# Patient Record
Sex: FEMALE | Race: WHITE | NOT HISPANIC OR LATINO
[De-identification: names, ages, dates, MRNs, and addresses within clinical notes are randomized per-mention and may not be internally consistent; named-entity substitution may affect disease eponyms.]

---

## 2017-05-26 ENCOUNTER — APPOINTMENT (OUTPATIENT)
Dept: FAMILY MEDICINE | Facility: HOSPITAL | Age: 57
End: 2017-05-26

## 2017-05-26 ENCOUNTER — OUTPATIENT (OUTPATIENT)
Dept: OUTPATIENT SERVICES | Facility: HOSPITAL | Age: 57
LOS: 1 days | End: 2017-05-26
Payer: MEDICAID

## 2017-05-26 VITALS
WEIGHT: 145 LBS | BODY MASS INDEX: 26.52 KG/M2 | SYSTOLIC BLOOD PRESSURE: 136 MMHG | RESPIRATION RATE: 14 BRPM | DIASTOLIC BLOOD PRESSURE: 93 MMHG | HEART RATE: 102 BPM | OXYGEN SATURATION: 97 % | TEMPERATURE: 97.3 F

## 2017-05-27 PROCEDURE — G0463: CPT

## 2017-05-27 PROCEDURE — 85025 COMPLETE CBC W/AUTO DIFF WBC: CPT

## 2017-05-27 PROCEDURE — 80053 COMPREHEN METABOLIC PANEL: CPT

## 2017-05-27 PROCEDURE — 36415 COLL VENOUS BLD VENIPUNCTURE: CPT

## 2017-05-27 PROCEDURE — 83036 HEMOGLOBIN GLYCOSYLATED A1C: CPT

## 2017-05-27 PROCEDURE — 80061 LIPID PANEL: CPT

## 2017-06-09 ENCOUNTER — OUTPATIENT (OUTPATIENT)
Dept: OUTPATIENT SERVICES | Facility: HOSPITAL | Age: 57
LOS: 1 days | End: 2017-06-09
Payer: MEDICAID

## 2017-06-09 ENCOUNTER — APPOINTMENT (OUTPATIENT)
Dept: FAMILY MEDICINE | Facility: HOSPITAL | Age: 57
End: 2017-06-09

## 2017-06-09 VITALS
HEIGHT: 62 IN | BODY MASS INDEX: 27.05 KG/M2 | SYSTOLIC BLOOD PRESSURE: 132 MMHG | RESPIRATION RATE: 14 BRPM | OXYGEN SATURATION: 95 % | HEART RATE: 65 BPM | TEMPERATURE: 98.2 F | DIASTOLIC BLOOD PRESSURE: 77 MMHG | WEIGHT: 147 LBS

## 2017-06-09 DIAGNOSIS — Z87.898 PERSONAL HISTORY OF OTHER SPECIFIED CONDITIONS: ICD-10-CM

## 2017-06-09 PROCEDURE — G0463: CPT

## 2017-06-09 RX ORDER — NAPROXEN 500 MG/1
500 TABLET ORAL
Qty: 20 | Refills: 0 | Status: COMPLETED | COMMUNITY
Start: 2017-06-09 | End: 2017-06-19

## 2018-02-23 ENCOUNTER — LABORATORY RESULT (OUTPATIENT)
Age: 58
End: 2018-02-23

## 2018-02-23 ENCOUNTER — OUTPATIENT (OUTPATIENT)
Dept: OUTPATIENT SERVICES | Facility: HOSPITAL | Age: 58
LOS: 1 days | End: 2018-02-23
Payer: MEDICAID

## 2018-02-23 ENCOUNTER — APPOINTMENT (OUTPATIENT)
Dept: INTERNAL MEDICINE | Facility: CLINIC | Age: 58
End: 2018-02-23
Payer: MEDICAID

## 2018-02-23 VITALS
SYSTOLIC BLOOD PRESSURE: 123 MMHG | OXYGEN SATURATION: 98 % | DIASTOLIC BLOOD PRESSURE: 60 MMHG | HEART RATE: 66 BPM | HEIGHT: 62 IN | BODY MASS INDEX: 26.31 KG/M2 | WEIGHT: 143 LBS

## 2018-02-23 DIAGNOSIS — H53.8 OTHER VISUAL DISTURBANCES: ICD-10-CM

## 2018-02-23 DIAGNOSIS — J01.10 ACUTE FRONTAL SINUSITIS, UNSPECIFIED: ICD-10-CM

## 2018-02-23 DIAGNOSIS — R79.89 OTHER SPECIFIED ABNORMAL FINDINGS OF BLOOD CHEMISTRY: ICD-10-CM

## 2018-02-23 DIAGNOSIS — Z23 ENCOUNTER FOR IMMUNIZATION: ICD-10-CM

## 2018-02-23 DIAGNOSIS — Z00.00 ENCOUNTER FOR GENERAL ADULT MEDICAL EXAMINATION W/OUT ABNORMAL FINDINGS: ICD-10-CM

## 2018-02-23 DIAGNOSIS — Z88.9 ALLERGY STATUS TO UNSPECIFIED DRUGS, MEDICAMENTS AND BIOLOGICAL SUBSTANCES: ICD-10-CM

## 2018-02-23 DIAGNOSIS — R19.5 OTHER FECAL ABNORMALITIES: ICD-10-CM

## 2018-02-23 DIAGNOSIS — M19.90 UNSPECIFIED OSTEOARTHRITIS, UNSPECIFIED SITE: ICD-10-CM

## 2018-02-23 DIAGNOSIS — J01.00 ACUTE MAXILLARY SINUSITIS, UNSPECIFIED: ICD-10-CM

## 2018-02-23 DIAGNOSIS — Z87.898 PERSONAL HISTORY OF OTHER SPECIFIED CONDITIONS: ICD-10-CM

## 2018-02-23 DIAGNOSIS — Z86.19 PERSONAL HISTORY OF OTHER INFECTIOUS AND PARASITIC DISEASES: ICD-10-CM

## 2018-02-23 PROCEDURE — 85027 COMPLETE CBC AUTOMATED: CPT

## 2018-02-23 PROCEDURE — 86140 C-REACTIVE PROTEIN: CPT

## 2018-02-23 PROCEDURE — 84443 ASSAY THYROID STIM HORMONE: CPT

## 2018-02-23 PROCEDURE — 86431 RHEUMATOID FACTOR QUANT: CPT

## 2018-02-23 PROCEDURE — 99213 OFFICE O/P EST LOW 20 MIN: CPT | Mod: GE

## 2018-02-23 PROCEDURE — 86200 CCP ANTIBODY: CPT

## 2018-02-23 PROCEDURE — G0008: CPT

## 2018-02-23 PROCEDURE — 83036 HEMOGLOBIN GLYCOSYLATED A1C: CPT

## 2018-02-23 PROCEDURE — 82306 VITAMIN D 25 HYDROXY: CPT

## 2018-02-23 PROCEDURE — G0463: CPT

## 2018-02-23 PROCEDURE — 80053 COMPREHEN METABOLIC PANEL: CPT

## 2018-02-23 PROCEDURE — 90688 IIV4 VACCINE SPLT 0.5 ML IM: CPT

## 2018-02-23 PROCEDURE — 86803 HEPATITIS C AB TEST: CPT

## 2018-02-23 PROCEDURE — 80061 LIPID PANEL: CPT

## 2018-02-26 DIAGNOSIS — I10 ESSENTIAL (PRIMARY) HYPERTENSION: ICD-10-CM

## 2018-02-27 LAB
ALBUMIN SERPL ELPH-MCNC: 4.3 G/DL — SIGNIFICANT CHANGE UP (ref 3.3–5)
ALP SERPL-CCNC: 93 U/L — SIGNIFICANT CHANGE UP (ref 40–120)
ALT FLD-CCNC: 21 U/L — SIGNIFICANT CHANGE UP (ref 10–45)
ANION GAP SERPL CALC-SCNC: 15 MMOL/L — SIGNIFICANT CHANGE UP (ref 5–17)
AST SERPL-CCNC: 16 U/L — SIGNIFICANT CHANGE UP (ref 10–40)
BILIRUB SERPL-MCNC: 0.2 MG/DL — SIGNIFICANT CHANGE UP (ref 0.2–1.2)
BUN SERPL-MCNC: 16 MG/DL — SIGNIFICANT CHANGE UP (ref 7–23)
CALCIUM SERPL-MCNC: 9.4 MG/DL — SIGNIFICANT CHANGE UP (ref 8.4–10.5)
CHLORIDE SERPL-SCNC: 102 MMOL/L — SIGNIFICANT CHANGE UP (ref 96–108)
CHOLEST SERPL-MCNC: 202 MG/DL — HIGH (ref 10–199)
CO2 SERPL-SCNC: 23 MMOL/L — SIGNIFICANT CHANGE UP (ref 22–31)
CREAT SERPL-MCNC: 0.69 MG/DL — SIGNIFICANT CHANGE UP (ref 0.5–1.3)
CRP SERPL-MCNC: 0.2 MG/DL — SIGNIFICANT CHANGE UP (ref 0–0.4)
GLUCOSE SERPL-MCNC: 92 MG/DL — SIGNIFICANT CHANGE UP (ref 70–99)
HBA1C BLD-MCNC: 5.6 % — SIGNIFICANT CHANGE UP (ref 4–5.6)
HCT VFR BLD CALC: 44.9 % — SIGNIFICANT CHANGE UP (ref 34.5–45)
HCV AB S/CO SERPL IA: 0.1 S/CO — SIGNIFICANT CHANGE UP
HCV AB SERPL-IMP: SIGNIFICANT CHANGE UP
HDLC SERPL-MCNC: 49 MG/DL — SIGNIFICANT CHANGE UP (ref 40–125)
HGB BLD-MCNC: 13.7 G/DL — SIGNIFICANT CHANGE UP (ref 11.5–15.5)
LIPID PNL WITH DIRECT LDL SERPL: 118 MG/DL — SIGNIFICANT CHANGE UP
MCHC RBC-ENTMCNC: 30.5 GM/DL — LOW (ref 32–36)
MCHC RBC-ENTMCNC: 30.7 PG — SIGNIFICANT CHANGE UP (ref 27–34)
MCV RBC AUTO: 100.7 FL — HIGH (ref 80–100)
PLATELET # BLD AUTO: 319 K/UL — SIGNIFICANT CHANGE UP (ref 150–400)
POTASSIUM SERPL-MCNC: 4.1 MMOL/L — SIGNIFICANT CHANGE UP (ref 3.5–5.3)
POTASSIUM SERPL-SCNC: 4.1 MMOL/L — SIGNIFICANT CHANGE UP (ref 3.5–5.3)
PROT SERPL-MCNC: 7.7 G/DL — SIGNIFICANT CHANGE UP (ref 6–8.3)
RBC # BLD: 4.46 M/UL — SIGNIFICANT CHANGE UP (ref 3.8–5.2)
RBC # FLD: 13.3 % — SIGNIFICANT CHANGE UP (ref 10.3–14.5)
RHEUMATOID FACT SERPL-ACNC: 8 IU/ML — SIGNIFICANT CHANGE UP (ref 0–13)
SODIUM SERPL-SCNC: 140 MMOL/L — SIGNIFICANT CHANGE UP (ref 135–145)
TOTAL CHOLESTEROL/HDL RATIO MEASUREMENT: 4.1 RATIO — SIGNIFICANT CHANGE UP (ref 3.3–7.1)
TRIGL SERPL-MCNC: 176 MG/DL — HIGH (ref 10–149)
TSH SERPL-MCNC: 1.11 UIU/ML — SIGNIFICANT CHANGE UP (ref 0.27–4.2)
WBC # BLD: 6.69 K/UL — SIGNIFICANT CHANGE UP (ref 3.8–10.5)
WBC # FLD AUTO: 6.69 K/UL — SIGNIFICANT CHANGE UP (ref 3.8–10.5)

## 2018-02-28 LAB
24R-OH-CALCIDIOL SERPL-MCNC: 12.3 NG/ML — LOW (ref 30–80)
CCP IGG SERPL-ACNC: <8 UNITS — SIGNIFICANT CHANGE UP (ref 0–19)
RF+CCP IGG SER-IMP: NEGATIVE — SIGNIFICANT CHANGE UP

## 2018-03-05 ENCOUNTER — OUTPATIENT (OUTPATIENT)
Dept: OUTPATIENT SERVICES | Facility: HOSPITAL | Age: 58
LOS: 1 days | End: 2018-03-05

## 2018-03-05 ENCOUNTER — APPOINTMENT (OUTPATIENT)
Dept: OPHTHALMOLOGY | Facility: CLINIC | Age: 58
End: 2018-03-05

## 2018-03-15 ENCOUNTER — FORM ENCOUNTER (OUTPATIENT)
Age: 58
End: 2018-03-15

## 2018-03-16 ENCOUNTER — OUTPATIENT (OUTPATIENT)
Dept: OUTPATIENT SERVICES | Facility: HOSPITAL | Age: 58
LOS: 1 days | End: 2018-03-16
Payer: MEDICAID

## 2018-03-16 ENCOUNTER — APPOINTMENT (OUTPATIENT)
Dept: MAMMOGRAPHY | Facility: CLINIC | Age: 58
End: 2018-03-16
Payer: MEDICAID

## 2018-03-16 DIAGNOSIS — Z00.8 ENCOUNTER FOR OTHER GENERAL EXAMINATION: ICD-10-CM

## 2018-03-16 PROCEDURE — 77063 BREAST TOMOSYNTHESIS BI: CPT | Mod: 26

## 2018-03-16 PROCEDURE — 77067 SCR MAMMO BI INCL CAD: CPT | Mod: 26

## 2018-03-16 PROCEDURE — 77067 SCR MAMMO BI INCL CAD: CPT

## 2018-03-16 PROCEDURE — 77063 BREAST TOMOSYNTHESIS BI: CPT

## 2018-03-20 ENCOUNTER — APPOINTMENT (OUTPATIENT)
Dept: OBGYN | Facility: CLINIC | Age: 58
End: 2018-03-20

## 2018-03-22 DIAGNOSIS — H25.13 AGE-RELATED NUCLEAR CATARACT, BILATERAL: ICD-10-CM

## 2018-04-12 ENCOUNTER — OUTPATIENT (OUTPATIENT)
Dept: OUTPATIENT SERVICES | Facility: HOSPITAL | Age: 58
LOS: 1 days | End: 2018-04-12
Payer: MEDICAID

## 2018-04-12 ENCOUNTER — LABORATORY RESULT (OUTPATIENT)
Age: 58
End: 2018-04-12

## 2018-04-12 ENCOUNTER — APPOINTMENT (OUTPATIENT)
Dept: OBGYN | Facility: CLINIC | Age: 58
End: 2018-04-12
Payer: MEDICAID

## 2018-04-12 VITALS — SYSTOLIC BLOOD PRESSURE: 118 MMHG | WEIGHT: 147 LBS | BODY MASS INDEX: 26.89 KG/M2 | DIASTOLIC BLOOD PRESSURE: 80 MMHG

## 2018-04-12 DIAGNOSIS — N95.2 POSTMENOPAUSAL ATROPHIC VAGINITIS: ICD-10-CM

## 2018-04-12 DIAGNOSIS — N76.0 ACUTE VAGINITIS: ICD-10-CM

## 2018-04-12 PROCEDURE — 87624 HPV HI-RISK TYP POOLED RSLT: CPT

## 2018-04-12 PROCEDURE — G0463: CPT

## 2018-04-12 PROCEDURE — 99214 OFFICE O/P EST MOD 30 MIN: CPT | Mod: NC

## 2018-04-12 RX ORDER — ESTRADIOL 0.1 MG/G
0.1 CREAM VAGINAL
Qty: 1 | Refills: 5 | Status: ACTIVE | COMMUNITY
Start: 2018-04-12 | End: 1900-01-01

## 2018-04-13 LAB
C TRACH RRNA SPEC QL NAA+PROBE: SIGNIFICANT CHANGE UP
HPV HIGH+LOW RISK DNA PNL CVX: SIGNIFICANT CHANGE UP
N GONORRHOEA RRNA SPEC QL NAA+PROBE: SIGNIFICANT CHANGE UP
SPECIMEN SOURCE: SIGNIFICANT CHANGE UP

## 2018-04-16 LAB — CYTOLOGY SPEC DOC CYTO: SIGNIFICANT CHANGE UP

## 2018-04-17 DIAGNOSIS — N95.2 POSTMENOPAUSAL ATROPHIC VAGINITIS: ICD-10-CM

## 2018-05-15 ENCOUNTER — APPOINTMENT (OUTPATIENT)
Dept: GASTROENTEROLOGY | Facility: HOSPITAL | Age: 58
End: 2018-05-15

## 2018-07-02 ENCOUNTER — APPOINTMENT (OUTPATIENT)
Dept: RHEUMATOLOGY | Facility: CLINIC | Age: 58
End: 2018-07-02

## 2019-04-29 ENCOUNTER — APPOINTMENT (OUTPATIENT)
Dept: INTERNAL MEDICINE | Facility: CLINIC | Age: 59
End: 2019-04-29
Payer: MEDICAID

## 2019-04-29 ENCOUNTER — OUTPATIENT (OUTPATIENT)
Dept: OUTPATIENT SERVICES | Facility: HOSPITAL | Age: 59
LOS: 1 days | End: 2019-04-29
Payer: MEDICAID

## 2019-04-29 VITALS
DIASTOLIC BLOOD PRESSURE: 88 MMHG | WEIGHT: 143 LBS | HEIGHT: 62 IN | HEART RATE: 72 BPM | OXYGEN SATURATION: 97 % | SYSTOLIC BLOOD PRESSURE: 120 MMHG | BODY MASS INDEX: 26.31 KG/M2

## 2019-04-29 DIAGNOSIS — M19.90 UNSPECIFIED OSTEOARTHRITIS, UNSPECIFIED SITE: ICD-10-CM

## 2019-04-29 DIAGNOSIS — I10 ESSENTIAL (PRIMARY) HYPERTENSION: ICD-10-CM

## 2019-04-29 DIAGNOSIS — Z00.00 ENCOUNTER FOR GENERAL ADULT MEDICAL EXAMINATION W/OUT ABNORMAL FINDINGS: ICD-10-CM

## 2019-04-29 DIAGNOSIS — R19.5 OTHER FECAL ABNORMALITIES: ICD-10-CM

## 2019-04-29 PROCEDURE — G0463: CPT

## 2019-04-29 PROCEDURE — 99213 OFFICE O/P EST LOW 20 MIN: CPT | Mod: GE

## 2019-04-29 RX ORDER — MECLIZINE HYDROCHLORIDE 25 MG/1
25 TABLET ORAL 3 TIMES DAILY
Qty: 15 | Refills: 0 | Status: DISCONTINUED | COMMUNITY
Start: 2017-05-26 | End: 2019-04-29

## 2019-04-29 RX ORDER — FLUTICASONE PROPIONATE 50 UG/1
50 SPRAY, METERED NASAL DAILY
Qty: 1 | Refills: 0 | Status: DISCONTINUED | COMMUNITY
Start: 2017-05-26 | End: 2019-04-29

## 2019-04-29 RX ORDER — MULTIVITAMIN/IRON/FOLIC ACID 18MG-0.4MG
600-400 TABLET ORAL
Qty: 180 | Refills: 3 | Status: DISCONTINUED | COMMUNITY
Start: 2018-04-12 | End: 2019-04-29

## 2019-04-29 RX ORDER — CHOLECALCIFEROL (VITAMIN D3) 1250 MCG
1.25 MG CAPSULE ORAL
Qty: 5 | Refills: 0 | Status: DISCONTINUED | COMMUNITY
Start: 2018-02-28 | End: 2019-04-29

## 2019-05-03 ENCOUNTER — RX RENEWAL (OUTPATIENT)
Age: 59
End: 2019-05-03

## 2019-05-03 LAB
25(OH)D3 SERPL-MCNC: 19.9 NG/ML
ANION GAP SERPL CALC-SCNC: 11 MMOL/L
BASOPHILS # BLD AUTO: 0.05 K/UL
BASOPHILS NFR BLD AUTO: 0.8 %
BUN SERPL-MCNC: 18 MG/DL
CALCIUM SERPL-MCNC: 10 MG/DL
CHLORIDE SERPL-SCNC: 106 MMOL/L
CHOLEST SERPL-MCNC: 189 MG/DL
CHOLEST/HDLC SERPL: 3.8 RATIO
CO2 SERPL-SCNC: 25 MMOL/L
CREAT SERPL-MCNC: 0.65 MG/DL
EOSINOPHIL # BLD AUTO: 0.25 K/UL
EOSINOPHIL NFR BLD AUTO: 4.1 %
ESTIMATED AVERAGE GLUCOSE: 114 MG/DL
GLUCOSE SERPL-MCNC: 95 MG/DL
HBA1C MFR BLD HPLC: 5.6 %
HCT VFR BLD CALC: 44.5 %
HDLC SERPL-MCNC: 50 MG/DL
HGB BLD-MCNC: 14.7 G/DL
IMM GRANULOCYTES NFR BLD AUTO: 0.2 %
LDLC SERPL CALC-MCNC: 103 MG/DL
LYMPHOCYTES # BLD AUTO: 1.92 K/UL
LYMPHOCYTES NFR BLD AUTO: 31.1 %
MAN DIFF?: NORMAL
MCHC RBC-ENTMCNC: 30.5 PG
MCHC RBC-ENTMCNC: 33 GM/DL
MCV RBC AUTO: 92.3 FL
MONOCYTES # BLD AUTO: 0.44 K/UL
MONOCYTES NFR BLD AUTO: 7.1 %
NEUTROPHILS # BLD AUTO: 3.5 K/UL
NEUTROPHILS NFR BLD AUTO: 56.7 %
PLATELET # BLD AUTO: 309 K/UL
POTASSIUM SERPL-SCNC: 4.4 MMOL/L
RBC # BLD: 4.82 M/UL
RBC # FLD: 12.4 %
SODIUM SERPL-SCNC: 142 MMOL/L
TRIGL SERPL-MCNC: 178 MG/DL
WBC # FLD AUTO: 6.17 K/UL

## 2019-05-03 NOTE — PHYSICAL EXAM
[No Acute Distress] : no acute distress [Well Developed] : well developed [EOMI] : extraocular movements intact [No JVD] : no jugular venous distention [Normal Outer Ear/Nose] : the outer ears and nose were normal in appearance [No Lymphadenopathy] : no lymphadenopathy [Supple] : supple [No Respiratory Distress] : no respiratory distress  [Normal Rate] : normal rate  [No Accessory Muscle Use] : no accessory muscle use [Normal S1, S2] : normal S1 and S2 [Regular Rhythm] : with a regular rhythm [Soft] : abdomen soft [No Edema] : there was no peripheral edema [No Murmur] : no murmur heard [Non-distended] : non-distended [Non Tender] : non-tender [No CVA Tenderness] : no CVA  tenderness [No Rash] : no rash [No Joint Swelling] : no joint swelling [Alert and Oriented x3] : oriented to person, place, and time

## 2019-05-05 NOTE — HISTORY OF PRESENT ILLNESS
[de-identified] : \par 59 y/o F w/ no significant PMHx presents for CPE.\par \par Bloody stools - Pt reports intermittently having small bright red blood in her stools for last year. Pt notes blood is present in toilet bowl water and when she wipes, but not in stool. She denies any loss of appetite, change in caliber of stools, abdominal pain, or signiificant weight loss. Patient was given colonoscopy referral last year but never scheduled appt. Patient smokes 1 cigarette nightly\par \par

## 2019-05-05 NOTE — ASSESSMENT
[FreeTextEntry1] : \par 57 y/o F w/ no significant PMHx presents for CPE\par \par #Bloody stools\par Pt has been having intermittent small bright red blood in stools for past year. Likely hemorrhoids, but will r/o other pathology.\par - Given referral for colonoscopy, and urged patient to schedule appt. Will call and try to expedite appt time\par \par \par #HCM\par - Last mammogram: 3/2018  BIRADS 1, mammogram referral given\par - Last PAP smear: 4/2018 neg\par - Colonoscopy referral given\par - DEXA referral given as per pt request\par - Tdap UTD\par - A1C, Lipid panel, CBC, BMP, Vit D today\par \par RTC as needed\par d/w Dr. Pickering

## 2019-05-06 DIAGNOSIS — R19.5 OTHER FECAL ABNORMALITIES: ICD-10-CM

## 2019-05-06 DIAGNOSIS — M19.90 UNSPECIFIED OSTEOARTHRITIS, UNSPECIFIED SITE: ICD-10-CM

## 2019-05-14 ENCOUNTER — APPOINTMENT (OUTPATIENT)
Dept: RHEUMATOLOGY | Facility: CLINIC | Age: 59
End: 2019-05-14

## 2019-05-16 DIAGNOSIS — R79.89 OTHER SPECIFIED ABNORMAL FINDINGS OF BLOOD CHEMISTRY: ICD-10-CM

## 2019-05-16 RX ORDER — ADHESIVE TAPE 3"X 2.3 YD
50 MCG TAPE, NON-MEDICATED TOPICAL
Qty: 90 | Refills: 0 | Status: ACTIVE | COMMUNITY
Start: 2019-05-16 | End: 1900-01-01

## 2019-05-20 ENCOUNTER — FORM ENCOUNTER (OUTPATIENT)
Age: 59
End: 2019-05-20

## 2019-05-21 ENCOUNTER — OUTPATIENT (OUTPATIENT)
Dept: OUTPATIENT SERVICES | Facility: HOSPITAL | Age: 59
LOS: 1 days | End: 2019-05-21
Payer: MEDICAID

## 2019-05-21 ENCOUNTER — APPOINTMENT (OUTPATIENT)
Dept: MAMMOGRAPHY | Facility: CLINIC | Age: 59
End: 2019-05-21

## 2019-05-21 ENCOUNTER — APPOINTMENT (OUTPATIENT)
Dept: RADIOLOGY | Facility: CLINIC | Age: 59
End: 2019-05-21
Payer: MEDICAID

## 2019-05-21 DIAGNOSIS — Z00.00 ENCOUNTER FOR GENERAL ADULT MEDICAL EXAMINATION WITHOUT ABNORMAL FINDINGS: ICD-10-CM

## 2019-05-21 PROCEDURE — 77067 SCR MAMMO BI INCL CAD: CPT

## 2019-05-21 PROCEDURE — 77063 BREAST TOMOSYNTHESIS BI: CPT

## 2019-05-21 PROCEDURE — 77080 DXA BONE DENSITY AXIAL: CPT

## 2019-05-21 PROCEDURE — 77080 DXA BONE DENSITY AXIAL: CPT | Mod: 26

## 2019-05-21 PROCEDURE — 77067 SCR MAMMO BI INCL CAD: CPT | Mod: 26

## 2019-05-21 PROCEDURE — 77063 BREAST TOMOSYNTHESIS BI: CPT | Mod: 26

## 2019-06-03 ENCOUNTER — APPOINTMENT (OUTPATIENT)
Dept: GASTROENTEROLOGY | Facility: CLINIC | Age: 59
End: 2019-06-03

## 2020-03-01 ENCOUNTER — OUTPATIENT (OUTPATIENT)
Dept: OUTPATIENT SERVICES | Facility: HOSPITAL | Age: 60
LOS: 1 days | End: 2020-03-01
Payer: MEDICAID

## 2020-03-01 PROCEDURE — G9001: CPT

## 2020-03-10 ENCOUNTER — EMERGENCY (EMERGENCY)
Facility: HOSPITAL | Age: 60
LOS: 1 days | Discharge: HOME | End: 2020-03-10
Attending: EMERGENCY MEDICINE
Payer: MEDICAID

## 2020-03-10 VITALS
HEIGHT: 60 IN | TEMPERATURE: 98 F | HEART RATE: 95 BPM | WEIGHT: 143.08 LBS | DIASTOLIC BLOOD PRESSURE: 87 MMHG | RESPIRATION RATE: 18 BRPM | OXYGEN SATURATION: 96 % | SYSTOLIC BLOOD PRESSURE: 137 MMHG

## 2020-03-10 VITALS
SYSTOLIC BLOOD PRESSURE: 119 MMHG | RESPIRATION RATE: 18 BRPM | TEMPERATURE: 98 F | OXYGEN SATURATION: 97 % | HEART RATE: 69 BPM | DIASTOLIC BLOOD PRESSURE: 77 MMHG

## 2020-03-10 LAB
ALBUMIN SERPL ELPH-MCNC: 4.6 G/DL — SIGNIFICANT CHANGE UP (ref 3.3–5)
ALP SERPL-CCNC: 85 U/L — SIGNIFICANT CHANGE UP (ref 40–120)
ALT FLD-CCNC: 20 U/L — SIGNIFICANT CHANGE UP (ref 10–45)
ANION GAP SERPL CALC-SCNC: 14 MMOL/L — SIGNIFICANT CHANGE UP (ref 5–17)
APTT BLD: 30.5 SEC — SIGNIFICANT CHANGE UP (ref 27.5–36.3)
AST SERPL-CCNC: 14 U/L — SIGNIFICANT CHANGE UP (ref 10–40)
BILIRUB SERPL-MCNC: 0.3 MG/DL — SIGNIFICANT CHANGE UP (ref 0.2–1.2)
BUN SERPL-MCNC: 13 MG/DL — SIGNIFICANT CHANGE UP (ref 7–23)
CALCIUM SERPL-MCNC: 9.5 MG/DL — SIGNIFICANT CHANGE UP (ref 8.4–10.5)
CHLORIDE SERPL-SCNC: 105 MMOL/L — SIGNIFICANT CHANGE UP (ref 96–108)
CK MB BLD-MCNC: 1.6 % — SIGNIFICANT CHANGE UP (ref 0–3.5)
CK MB CFR SERPL CALC: 1.9 NG/ML — SIGNIFICANT CHANGE UP (ref 0–3.8)
CK SERPL-CCNC: 122 U/L — SIGNIFICANT CHANGE UP (ref 25–170)
CO2 SERPL-SCNC: 22 MMOL/L — SIGNIFICANT CHANGE UP (ref 22–31)
CREAT SERPL-MCNC: 0.6 MG/DL — SIGNIFICANT CHANGE UP (ref 0.5–1.3)
GLUCOSE SERPL-MCNC: 105 MG/DL — HIGH (ref 70–99)
HCT VFR BLD CALC: 40.2 % — SIGNIFICANT CHANGE UP (ref 34.5–45)
HGB BLD-MCNC: 13.6 G/DL — SIGNIFICANT CHANGE UP (ref 11.5–15.5)
INR BLD: 1.01 RATIO — SIGNIFICANT CHANGE UP (ref 0.88–1.16)
MCHC RBC-ENTMCNC: 30.6 PG — SIGNIFICANT CHANGE UP (ref 27–34)
MCHC RBC-ENTMCNC: 33.8 GM/DL — SIGNIFICANT CHANGE UP (ref 32–36)
MCV RBC AUTO: 90.5 FL — SIGNIFICANT CHANGE UP (ref 80–100)
NRBC # BLD: 0 /100 WBCS — SIGNIFICANT CHANGE UP (ref 0–0)
PLATELET # BLD AUTO: 271 K/UL — SIGNIFICANT CHANGE UP (ref 150–400)
POTASSIUM SERPL-MCNC: 3.8 MMOL/L — SIGNIFICANT CHANGE UP (ref 3.5–5.3)
POTASSIUM SERPL-SCNC: 3.8 MMOL/L — SIGNIFICANT CHANGE UP (ref 3.5–5.3)
PROT SERPL-MCNC: 8.3 G/DL — SIGNIFICANT CHANGE UP (ref 6–8.3)
PROTHROM AB SERPL-ACNC: 11.6 SEC — SIGNIFICANT CHANGE UP (ref 10–12.9)
RBC # BLD: 4.44 M/UL — SIGNIFICANT CHANGE UP (ref 3.8–5.2)
RBC # FLD: 12.2 % — SIGNIFICANT CHANGE UP (ref 10.3–14.5)
SODIUM SERPL-SCNC: 141 MMOL/L — SIGNIFICANT CHANGE UP (ref 135–145)
TROPONIN T, HIGH SENSITIVITY RESULT: <6 NG/L — SIGNIFICANT CHANGE UP (ref 0–51)
WBC # BLD: 7.7 K/UL — SIGNIFICANT CHANGE UP (ref 3.8–10.5)
WBC # FLD AUTO: 7.7 K/UL — SIGNIFICANT CHANGE UP (ref 3.8–10.5)

## 2020-03-10 PROCEDURE — 93005 ELECTROCARDIOGRAM TRACING: CPT

## 2020-03-10 PROCEDURE — 99285 EMERGENCY DEPT VISIT HI MDM: CPT

## 2020-03-10 PROCEDURE — 85610 PROTHROMBIN TIME: CPT

## 2020-03-10 PROCEDURE — 96374 THER/PROPH/DIAG INJ IV PUSH: CPT

## 2020-03-10 PROCEDURE — 71046 X-RAY EXAM CHEST 2 VIEWS: CPT | Mod: 26

## 2020-03-10 PROCEDURE — 71046 X-RAY EXAM CHEST 2 VIEWS: CPT

## 2020-03-10 PROCEDURE — 85730 THROMBOPLASTIN TIME PARTIAL: CPT

## 2020-03-10 PROCEDURE — 82550 ASSAY OF CK (CPK): CPT

## 2020-03-10 PROCEDURE — 84484 ASSAY OF TROPONIN QUANT: CPT

## 2020-03-10 PROCEDURE — 99284 EMERGENCY DEPT VISIT MOD MDM: CPT | Mod: 25

## 2020-03-10 PROCEDURE — 82553 CREATINE MB FRACTION: CPT

## 2020-03-10 PROCEDURE — 85027 COMPLETE CBC AUTOMATED: CPT

## 2020-03-10 PROCEDURE — 80053 COMPREHEN METABOLIC PANEL: CPT

## 2020-03-10 RX ORDER — ACETAMINOPHEN 500 MG
650 TABLET ORAL ONCE
Refills: 0 | Status: COMPLETED | OUTPATIENT
Start: 2020-03-10 | End: 2020-03-10

## 2020-03-10 RX ORDER — SODIUM CHLORIDE 9 MG/ML
1000 INJECTION INTRAMUSCULAR; INTRAVENOUS; SUBCUTANEOUS ONCE
Refills: 0 | Status: COMPLETED | OUTPATIENT
Start: 2020-03-10 | End: 2020-03-10

## 2020-03-10 RX ORDER — KETOROLAC TROMETHAMINE 30 MG/ML
15 SYRINGE (ML) INJECTION ONCE
Refills: 0 | Status: DISCONTINUED | OUTPATIENT
Start: 2020-03-10 | End: 2020-03-10

## 2020-03-10 RX ADMIN — Medication 15 MILLIGRAM(S): at 13:03

## 2020-03-10 RX ADMIN — Medication 30 MILLILITER(S): at 13:03

## 2020-03-10 RX ADMIN — SODIUM CHLORIDE 1000 MILLILITER(S): 9 INJECTION INTRAMUSCULAR; INTRAVENOUS; SUBCUTANEOUS at 13:03

## 2020-03-10 RX ADMIN — Medication 650 MILLIGRAM(S): at 13:03

## 2020-03-10 NOTE — ED PROVIDER NOTE - ATTENDING CONTRIBUTION TO CARE
I have seen and evaluated this patient with the resident.   I agree with the findings  unless other wise stated.  I have made appropriate changes in documentations where needed, After my face to face bedside evaluation, I am further  notin year old Ivorian-speaking female with history of 2 falls in past week with head injury x2 s/p full work-up with head imaging showing no abnormalities at OSH, c/o chest pain x1 day. Patient with daughter at bedside: they say at 9am this morning patient had sudden onset of shaking, occipital headache, chest pain with radiation to left side, dry mouth and SOB. Patient has no history of panic attacks, ACS or angina. No PMH other than the 2 recent falls which prompted patient to seek medical work-up. She says she does not remember feeling dizzy prior to the falls, and only remembers falling forward onto her face outside her house. She does not recall any other associated symptoms. Outpatient cardiac work-up scheduled. No sick contacts. Patient denies feeling anxious this morning during the episode but endorses generalized anxiety and sadness over the past few years due to multiple relatives dying in a short period of time. Gen: Alert, NAD Head: NC, AT, PERRL, EOMI, normal lids/conjunctiva ENT: patent oropharynx without erythema/exudate, uvula midline Neck: +supple, no tenderness/meningismus/JVD, +Trachea midline Pulm: Bilateral BS, normal resp effort, no wheeze/stridor/retractions CV: RRR, no M/R/G, +dist pulses Abd: soft, NT/ND, +BS,  Mskel: no edema/erythema/cyanosis Skin: bruises on face  Neuro: AAOx3, no sensory/motor deficits, CN 2-12 intact seems post concussion neurologically intact outpatient f/u  Compliance to diet and medicines stressed will have out patient follow up. Return instructions discussed with patient and patient understood --Kilo

## 2020-03-10 NOTE — ED PROVIDER NOTE - NS ED ROS FT
Constitutional: [  ] fevers [ ] chills [ ] weight loss [ ] weight gain [  ] diaphoresis  HEENT: [ ] dry eyes [ ] eye irritation [ ] postnasal drip [ ] nasal congestion  CV: [x] chest pain [ ] orthopnea [ ] palpitations [ ] murmur  Resp: [  ] cough [ ] shortness of breath [ ] dyspnea [ ] wheezing [ ] sputum [ ] hemoptysis  GI: [ ] nausea [ ] vomiting [ ] diarrhea [ ] constipation [ ] abd pain [ ] dysphagia   : [ ] dysuria [ ] nocturia [ ] hematuria [ ] increased urinary frequency  Musculoskeletal: [ ] back pain [ ] myalgias [ ] arthralgias [ ] fracture  Skin: [ ] rash [ ] itch  Neurological: [x] headache [ ] dizziness [ ] syncope [ ] weakness [ ] numbness  Psychiatric: [ ] anxiety [ ] depression  Endocrine: [ ] diabetes [ ] thyroid problem  Hematologic/Lymphatic: [ ] anemia [ ] bleeding problem  Allergic/Immunologic: [ ] itchy eyes [ ] nasal discharge [ ] hives [ ] angioedema  [x] All other systems negative

## 2020-03-10 NOTE — ED PROVIDER NOTE - PRO INTERPRETER NEED 2
Continue Regimen: Ketoconazole 2% shampoo apply to scalp when needed \\nClobetasol scalp Solution apply only when flaring nightly
Detail Level: Zone
Initiate Treatment: Ketoconazole 2% shampoo wash daily to affected area in shower \\nDiflucan 200mg 1 PO STAT then repeat in 1 week
Iranian

## 2020-03-10 NOTE — ED PROVIDER NOTE - PHYSICAL EXAMINATION
GENERAL: Intermittently tearful, NAD, well-developed  HEAD:  Multiple bruises on face, normocephalic  EYES: EOMI, PERRLA, conjunctiva and sclera clear  NECK: Pain with forward flexion, No JVD  CHEST/LUNG: Clear to auscultation bilaterally; No wheeze  HEART: Regular rate and rhythm; No murmurs, rubs, or gallops  ABDOMEN: Soft, Nontender, Nondistended; Bowel sounds present  EXTREMITIES:  2+ Peripheral Pulses, No clubbing, cyanosis, or edema  PSYCH: AAOx3  NEUROLOGY: Normal gait, muscle tone. Toe-walk and tandem walk WNL. Muscle strength 5/5 in all four extremities.   SKIN: No rashes or lesions

## 2020-03-10 NOTE — ED ADULT NURSE NOTE - NSIMPLEMENTINTERV_GEN_ALL_ED
Implemented All Universal Safety Interventions:  [place] to call system. Call bell, personal items and telephone within reach. Instruct patient to call for assistance. Room bathroom lighting operational. Non-slip footwear when patient is off stretcher. Physically safe environment: no spills, clutter or unnecessary equipment. Stretcher in lowest position, wheels locked, appropriate side rails in place.

## 2020-03-10 NOTE — ED PROVIDER NOTE - NSFOLLOWUPINSTRUCTIONS_ED_ALL_ED_FT
What is post-concussion syndrome (PCS)? PCS is a group of symptoms that affect your nerves, thinking, and behavior. PCS develops shortly after a concussion and can last for weeks to months.    What increases my risk for PCS?   -Older age  -A substance abuse problem, such as drugs or alcohol  -A past head injury  -A current mood or anxiety disorder  -Poor physical health before your concussion    What are the signs and symptoms of PCS?   -Headaches or vision problems  -Dizziness or poor balance  -Forgetfulness or problems concentrating  -Problems with sleep  -Changes in your personality  -Seizures  -Depression or anxiety    How is PCS diagnosed?   Your healthcare provider will ask about your injury. Tell the provider when it happened, what hit you, and the force of the blow. You, or someone close to you, should tell your provider about any confusion you have or changes in your behavior. You may need any of the following:     A neurologic exam is used to test your memory and your ability to recognize familiar things. It will also show healthcare providers your eye, verbal, and muscle responses.    Blood and urine tests will be done to make sure there is no other cause for your symptoms. Infections and chemicals, such as alcohol, can affect your memory and behavior.    CT scan pictures of your head may show an injury. You may be given contrast liquid to help healthcare providers see the pictures better. Tell the healthcare provider if you have ever had an allergic reaction to contrast liquid.    How is PCS treated? Treatment of PCS will focus on your symptoms:     Acetaminophen decreases pain and fever. It is available without a doctor's order. Ask how much to take and how often to take it. Follow directions. Read the labels of all other medicines you are using to see if they also contain acetaminophen, or ask your doctor or pharmacist. Acetaminophen can cause liver damage if not taken correctly. Do not use more than 4 grams (4,000 milligrams) total of acetaminophen in one day.     NSAIDs help decrease swelling and pain or fever. This medicine is available with or without a doctor's order. NSAIDs can cause stomach bleeding or kidney problems in certain people. If you take blood thinner medicine, always ask your healthcare provider if NSAIDs are safe for you. Always read the medicine label and follow directions.    Antidepressants may be given for depression or sleep problems.    Migraine medicines may be given for migraine headaches.    What are the risks of PCS? PCS may decrease your ability to function at home, school, or work. You may develop persistent post-concussion syndrome. You may develop second-impact syndrome if you have another concussion before you have recovered from the first. Second-impact syndrome can be life-threatening.    How can I prevent PCS?   Make your home safe. Home safety measures can help prevent head injuries that could lead to a concussion. Install handrails for every staircase. Put soft bumpers on furniture edges and corners. Secure furniture, such as dressers and book cases so they do not fall over.    Always wear a seatbelt in the car. This helps decrease your risk for a head injury if you are in a car accident.    What can I do to manage my symptoms?   Rest from physical and mental activities as directed. Mental activities need you to think, concentrate, and pay attention. Rest will help you recover from your concussion. Ask your healthcare provider when you can return to school and other daily activities.    Go to therapy as directed. A cognitive behavioral therapist teaches you skills to help with any thinking and behavior problems you may have. An occupational therapist teaches your skills to help with daily activities.    Do not participate in sports or physical activities until your healthcare provider says it is okay. These activities could make your symptoms worse or lead to another concussion. Your healthcare provider will tell you when it is okay to return to sports or physical activities.    Where can I find more information?   [employer]  [address] [place] [address]  Phone: [phone]  Phone: [phone]  Web Address: [email or web address]    Have someone call 911 for any of the following:   -You have a seizure.  -You have trouble breathing.  -You are not responding or you cannot be woken.    When should I seek immediate care?   -You have a sudden headache that seems different or much worse than your usual headaches.  -You cannot stop vomiting.  -You have sudden changes in your vision.    When should I contact my healthcare provider?   -You have nausea or are vomiting.  -You have trouble concentrating.  -You have difficulty speaking or thinking.  -Your symptoms get worse.  -You have questions or concerns about your condition or care.

## 2020-03-10 NOTE — ED ADULT NURSE NOTE - OBJECTIVE STATEMENT
59 year old fe 59 year old female presents to the ED complaining of a resolved episode of SOB, chest pain, and occipital headache with pain radiating to left side of neck. As per Pt she still has the headache and neck pain, states that the chest pain nand SOB have resolved. Pt states she fell x 2 weeks ago and hit her head, pt had negative CT and neurological work up at that time. Pt is A&O x 4, VSS, afebrile, ambulating independently. Pt denies fever, chills, NVD, SOB, or chest pain. On neurological assessment no neural or focal deficits noted. Pt placed on CM showing NS, Pt EKG showing NS. Pt daughter at bedside, and is Pt's preferred .

## 2020-03-10 NOTE — ED ADULT TRIAGE NOTE - CHIEF COMPLAINT QUOTE
Sudden onset of HA at 0900, pt began having dry mouth with difficulty breathing. Pt speaking in full sentences, Reports midsternal chest pain radiating to left side.

## 2020-03-10 NOTE — ED PROVIDER NOTE - PATIENT PORTAL LINK FT
You can access the FollowMyHealth Patient Portal offered by [facility] by registering at the following website: [email or web address]. By joining [employer]’s FollowMyHealth portal, you will also be able to view your health information using other applications (apps) compatible with our system.

## 2020-03-10 NOTE — ED PROVIDER NOTE - OBJECTIVE STATEMENT
59 year old Bulgarian-speaking female with history of 2 falls in past week with head injury x2 s/p full work-up with head imaging showing no abnormalities at OSH, c/o chest pain x1 day. Patient with daughter at bedside: they say at 9am this morning patient had sudden onset of shaking, occipital headache, chest pain with radiation to left side, dry mouth and SOB. Patient has no history of panic attacks, ACS or angina. No PMH other than the 2 recent falls which prompted patient to seek medical work-up. She says she does not remember feeling dizzy prior to the falls, and only remembers falling forward onto her face outside her house. She does not recall any other associated symptoms. Outpatient cardiac work-up scheduled. No sick contacts. Patient denies feeling anxious this morning during the episode but endorses generalized anxiety and sadness over the past few years due to multiple relatives dying in a short period of time.

## 2020-03-10 NOTE — ED PROVIDER NOTE - CLINICAL SUMMARY MEDICAL DECISION MAKING FREE TEXT BOX
60 yo F with PMH of 2 falls with head trauma in past week, p/w chest pain a/w occipital headache, dry mouth and SOB. Patient has no prior cardiac history, and on exam has no focal neurologic deficits. Of note, patient had full work-up after recent falls with no findings on MRI. DDx: Rule-out ACS vs. post-concussion syndrome vs. neck muscle spasm vs. panic attack.    Plan:  1. R/o ACS: EKG, cardiac monitor, cardiac enzymes, chest x-ray, IV toradol  2. H/o recent syncope: CBC, CMP, PT/PTT/INR, IV bolus  3. Headache: PO tylenol 60 yo F with PMH of 2 falls with head trauma in past week, p/w chest pain a/w occipital headache, dry mouth and SOB. Patient has no prior cardiac history, and on exam has no focal neurologic deficits. Of note, patient had full work-up after recent falls with no findings on MRI. DDx: Rule-out ACS vs. post-concussion syndrome vs. neck muscle spasm vs. panic attack.    Plan:  1. R/o ACS: EKG, cardiac monitor, cardiac enzymes, chest x-ray, IV toradol  2. H/o recent syncope: CBC, CMP, PT/PTT/INR, IV bolus  3. Headache: PO tylenol  4. R/o neck muscle spasm: hot packs on neck

## 2020-03-12 DIAGNOSIS — Z71.89 OTHER SPECIFIED COUNSELING: ICD-10-CM

## 2022-03-09 NOTE — ED PROVIDER NOTE - EKG ADDITIONAL QUESTION - PERFORMED INDEPENDENT VISUALIZATION
Health Maintenance Due   Topic Date Due   • Diabetes Foot Exam  08/03/2018   • Medicare Advantage- Medicare Wellness Visit  01/01/2022       Patient is due for topics as listed above but is not proceeding with Immunization(s) COVID-19 at this time. Education provided for Diabetes Foot Exam.   Yes

## 2023-05-14 ENCOUNTER — EMERGENCY (EMERGENCY)
Facility: HOSPITAL | Age: 63
LOS: 1 days | Discharge: HOME | End: 2023-05-14
Attending: EMERGENCY MEDICINE
Payer: MEDICAID

## 2023-05-14 VITALS
RESPIRATION RATE: 18 BRPM | HEART RATE: 104 BPM | OXYGEN SATURATION: 95 % | TEMPERATURE: 98 F | SYSTOLIC BLOOD PRESSURE: 131 MMHG | DIASTOLIC BLOOD PRESSURE: 83 MMHG

## 2023-05-14 VITALS
SYSTOLIC BLOOD PRESSURE: 111 MMHG | RESPIRATION RATE: 18 BRPM | TEMPERATURE: 98 F | WEIGHT: 139.99 LBS | OXYGEN SATURATION: 95 % | HEART RATE: 111 BPM | DIASTOLIC BLOOD PRESSURE: 66 MMHG

## 2023-05-14 LAB
ALBUMIN SERPL ELPH-MCNC: 5.1 G/DL — HIGH (ref 3.3–5)
ALP SERPL-CCNC: 100 U/L — SIGNIFICANT CHANGE UP (ref 40–120)
ALT FLD-CCNC: 32 U/L — SIGNIFICANT CHANGE UP (ref 10–45)
ANION GAP SERPL CALC-SCNC: 19 MMOL/L — HIGH (ref 5–17)
AST SERPL-CCNC: 31 U/L — SIGNIFICANT CHANGE UP (ref 10–40)
BASOPHILS # BLD AUTO: 0 K/UL — SIGNIFICANT CHANGE UP (ref 0–0.2)
BASOPHILS NFR BLD AUTO: 0 % — SIGNIFICANT CHANGE UP (ref 0–2)
BILIRUB SERPL-MCNC: 0.3 MG/DL — SIGNIFICANT CHANGE UP (ref 0.2–1.2)
BUN SERPL-MCNC: 28 MG/DL — HIGH (ref 7–23)
CALCIUM SERPL-MCNC: 9.8 MG/DL — SIGNIFICANT CHANGE UP (ref 8.4–10.5)
CHLORIDE SERPL-SCNC: 107 MMOL/L — SIGNIFICANT CHANGE UP (ref 96–108)
CO2 SERPL-SCNC: 15 MMOL/L — LOW (ref 22–31)
CREAT SERPL-MCNC: 1.12 MG/DL — SIGNIFICANT CHANGE UP (ref 0.5–1.3)
EGFR: 55 ML/MIN/1.73M2 — LOW
EOSINOPHIL # BLD AUTO: 0 K/UL — SIGNIFICANT CHANGE UP (ref 0–0.5)
EOSINOPHIL NFR BLD AUTO: 0 % — SIGNIFICANT CHANGE UP (ref 0–6)
GLUCOSE SERPL-MCNC: 157 MG/DL — HIGH (ref 70–99)
HCT VFR BLD CALC: 45.8 % — HIGH (ref 34.5–45)
HGB BLD-MCNC: 15.7 G/DL — HIGH (ref 11.5–15.5)
LIDOCAIN IGE QN: 12 U/L — SIGNIFICANT CHANGE UP (ref 7–60)
LYMPHOCYTES # BLD AUTO: 0.21 K/UL — LOW (ref 1–3.3)
LYMPHOCYTES # BLD AUTO: 1.7 % — LOW (ref 13–44)
MAGNESIUM SERPL-MCNC: 2.6 MG/DL — SIGNIFICANT CHANGE UP (ref 1.6–2.6)
MANUAL SMEAR VERIFICATION: SIGNIFICANT CHANGE UP
MCHC RBC-ENTMCNC: 31.1 PG — SIGNIFICANT CHANGE UP (ref 27–34)
MCHC RBC-ENTMCNC: 34.3 GM/DL — SIGNIFICANT CHANGE UP (ref 32–36)
MCV RBC AUTO: 90.7 FL — SIGNIFICANT CHANGE UP (ref 80–100)
MONOCYTES # BLD AUTO: 0.53 K/UL — SIGNIFICANT CHANGE UP (ref 0–0.9)
MONOCYTES NFR BLD AUTO: 4.4 % — SIGNIFICANT CHANGE UP (ref 2–14)
NEUTROPHILS # BLD AUTO: 11.37 K/UL — HIGH (ref 1.8–7.4)
NEUTROPHILS NFR BLD AUTO: 93.9 % — HIGH (ref 43–77)
PHOSPHATE SERPL-MCNC: 4.9 MG/DL — HIGH (ref 2.5–4.5)
PLAT MORPH BLD: NORMAL — SIGNIFICANT CHANGE UP
PLATELET # BLD AUTO: 305 K/UL — SIGNIFICANT CHANGE UP (ref 150–400)
POTASSIUM SERPL-MCNC: 4.4 MMOL/L — SIGNIFICANT CHANGE UP (ref 3.5–5.3)
POTASSIUM SERPL-SCNC: 4.4 MMOL/L — SIGNIFICANT CHANGE UP (ref 3.5–5.3)
PROT SERPL-MCNC: 9.3 G/DL — HIGH (ref 6–8.3)
RBC # BLD: 5.05 M/UL — SIGNIFICANT CHANGE UP (ref 3.8–5.2)
RBC # FLD: 12.7 % — SIGNIFICANT CHANGE UP (ref 10.3–14.5)
RBC BLD AUTO: SIGNIFICANT CHANGE UP
SODIUM SERPL-SCNC: 141 MMOL/L — SIGNIFICANT CHANGE UP (ref 135–145)
WBC # BLD: 12.11 K/UL — HIGH (ref 3.8–10.5)
WBC # FLD AUTO: 12.11 K/UL — HIGH (ref 3.8–10.5)

## 2023-05-14 PROCEDURE — 36415 COLL VENOUS BLD VENIPUNCTURE: CPT

## 2023-05-14 PROCEDURE — 84100 ASSAY OF PHOSPHORUS: CPT

## 2023-05-14 PROCEDURE — 83690 ASSAY OF LIPASE: CPT

## 2023-05-14 PROCEDURE — 85025 COMPLETE CBC W/AUTO DIFF WBC: CPT

## 2023-05-14 PROCEDURE — 96374 THER/PROPH/DIAG INJ IV PUSH: CPT

## 2023-05-14 PROCEDURE — 80053 COMPREHEN METABOLIC PANEL: CPT

## 2023-05-14 PROCEDURE — 99284 EMERGENCY DEPT VISIT MOD MDM: CPT

## 2023-05-14 PROCEDURE — 83735 ASSAY OF MAGNESIUM: CPT

## 2023-05-14 PROCEDURE — 99284 EMERGENCY DEPT VISIT MOD MDM: CPT | Mod: 25

## 2023-05-14 RX ORDER — ONDANSETRON 8 MG/1
4 TABLET, FILM COATED ORAL ONCE
Refills: 0 | Status: COMPLETED | OUTPATIENT
Start: 2023-05-14 | End: 2023-05-14

## 2023-05-14 RX ORDER — SODIUM CHLORIDE 9 MG/ML
1000 INJECTION INTRAMUSCULAR; INTRAVENOUS; SUBCUTANEOUS ONCE
Refills: 0 | Status: COMPLETED | OUTPATIENT
Start: 2023-05-14 | End: 2023-05-14

## 2023-05-14 RX ORDER — ACETAMINOPHEN 500 MG
650 TABLET ORAL ONCE
Refills: 0 | Status: COMPLETED | OUTPATIENT
Start: 2023-05-14 | End: 2023-05-14

## 2023-05-14 RX ORDER — ONDANSETRON 8 MG/1
1 TABLET, FILM COATED ORAL
Qty: 2 | Refills: 0
Start: 2023-05-14 | End: 2023-05-18

## 2023-05-14 RX ADMIN — ONDANSETRON 4 MILLIGRAM(S): 8 TABLET, FILM COATED ORAL at 19:36

## 2023-05-14 RX ADMIN — SODIUM CHLORIDE 1000 MILLILITER(S): 9 INJECTION INTRAMUSCULAR; INTRAVENOUS; SUBCUTANEOUS at 21:05

## 2023-05-14 RX ADMIN — Medication 650 MILLIGRAM(S): at 21:04

## 2023-05-14 RX ADMIN — Medication 30 MILLILITER(S): at 19:36

## 2023-05-14 RX ADMIN — SODIUM CHLORIDE 1000 MILLILITER(S): 9 INJECTION INTRAMUSCULAR; INTRAVENOUS; SUBCUTANEOUS at 19:36

## 2023-05-14 NOTE — ED ADULT NURSE NOTE - OBJECTIVE STATEMENT
61 y/o female A&OX4, Uzbek speaking, requesting family to interpret. Came to the ED with complaints of abdominal pains with nausea and vomiting that started yesterday. unable to hold and food or fluids down. Multiple people are sick at home with similar symptoms. Denies CP, SOB, back and flank pains, dizziness, weakness, dysuria.

## 2023-05-14 NOTE — ED PROVIDER NOTE - OBJECTIVE STATEMENT
62-year-old female no relevant past medical history presenting for evaluation of 2 days of multiple episodes of nausea vomiting and diarrhea associated with dull headache, dizziness and generalized weakness.  Patient has sick contacts at home.  Denies fever, abdominal pain, dysuria, hematuria, shortness of breath, chest pain.  Patient denies blood in the vomit and stool.  Patient has been unable to tolerate p.o.

## 2023-05-14 NOTE — ED PROVIDER NOTE - ATTENDING CONTRIBUTION TO CARE
Hx: n/v/d since yesterday, no fever, +slight chills.  No ab pain.  +reflux. No cp, sob.     PE: well appearing, dry mm, nontender abdomen, clear lungs.    MDM: n/v/d, likely gastroenteritis, r/o pancreatitis, hepatitis, check cbc r/o anemia or leukocytosis, check bmp to r/o metabolic derangement and lyte imbalance, considered but not indicated for ct scan abdomen at this time.     Progress Note 2100: pt feeling better, nontender abdomen.  Labs reviewed, moderate to severe dehydration with metabolic acidosis, will give additional fluids and reassess.

## 2023-05-14 NOTE — ED PROVIDER NOTE - PATIENT PORTAL LINK FT
You can access the FollowMyHealth Patient Portal offered by [facility] by registering at the following website: [email or web address]. By joining [employer]’s FollowMyHealth portal, you will also be able to view your health information using other applications (apps) compatible with our system.

## 2023-05-14 NOTE — ED PROVIDER NOTE - PHYSICAL EXAMINATION
GENERAL: well appearing in no acute distress, non-toxic appearing  HEAD: normocephalic, atraumatic  HEENT: normal conjunctiva, oral mucosa dry, uvula midline  CARDIAC: tachycardic, no appreciable murmurs, 2+ pulses in UE/LE b/l  PULM: normal breath sounds, clear to ascultation bilaterally, no rales, rhonchi, wheezing  GI: abdomen nondistended, soft, nontender, no guarding, rebound tenderness  NEURO: AAOx3  MSK: no peripheral edema, no calf tenderness b/l  SKIN: well-perfused, extremities warm, no visible rashes, normal skin turgor   PSYCH: appropriate mood and affect Dorsal Nasal Flap Text: The defect edges were debeveled with a #15 scalpel blade.  Given the location of the defect and the proximity to free margins a dorsal nasal flap was deemed most appropriate.  Using a sterile surgical marker, an appropriate dorsal nasal flap was drawn around the defect.    The area thus outlined was incised deep to adipose tissue with a #15 scalpel blade.  The skin margins were undermined to an appropriate distance in all directions utilizing iris scissors.

## 2023-05-14 NOTE — ED PROVIDER NOTE - PROGRESS NOTE DETAILS
Velia Vincent, PGY1, MD: pt tolerating po. discussed all lab results with pt and daughter at bedside, given return precautions, pt understands and sgrees with plan

## 2023-05-14 NOTE — ED PROVIDER NOTE - CLINICAL SUMMARY MEDICAL DECISION MAKING FREE TEXT BOX
62-year-old female presenting for evaluation of multiple episodes of vomiting and diarrhea x2 days with positive sick contacts.  Afebrile, tachycardic but otherwise hemodynamically stable with dry mucous membranes on exam, soft nontender nondistended abdomen.      Differential diagnosis includes but is not limited to dehydration secondary to viral gastroenteritis given patient's sick contacts versus infectious cause of gastroenteritis, unlikely to be C. difficile given patient does not have exposure to hospital settings.  Unlikely to be colitis given lack of abdominal pain, unlikely to be acute cystitis given lack of dysuria and hematuria.  Will check CBC, CMP, lipase for electrolyte abnormalities and laboratory evidence of dehydration, will fluid resuscitate, give Zofran, Tylenol, Maalox and reassess.

## 2023-05-14 NOTE — ED PROVIDER NOTE - NSFOLLOWUPINSTRUCTIONS_ED_ALL_ED_FT
Please keep hydrated     please return to the emergency department if you have worsening vomiting and diarrhea. Please keep hydrated     please return to the emergency department if you have worsening vomiting and diarrhea.    Please use zofran as needed, please follow instructions on the prescription       Viral Gastroenteritis, Adult     Viral gastroenteritis is also known as the stomach flu. This condition may affect your stomach, your small intestine, and your large intestine. It can cause sudden watery poop (diarrhea), fever, and throwing up (vomiting). This condition is caused by certain germs (viruses). These germs can be passed from person to person very easily (are contagious).    Having watery poop and throwing up can make you feel weak and cause you to not have enough water in your body (get dehydrated). This can make you tired and thirsty, make you have a dry mouth, and make it so you pee (urinate) less often. It is important to replace the fluids that you lose from having watery poop and throwing up.    What are the causes?  •You can get sick by catching viruses from other people.  •You can also get sick by:  •Eating food, drinking water, or touching a surface that has the viruses on it (is contaminated).  •Sharing utensils or other personal items with a person who is sick.    What increases the risk?  •Having a weak body defense system (immune system).  •Living with one or more children who are younger than 2 years old.  •Living in a nursing home.  •Going on cruise ships.    What are the signs or symptoms?    Symptoms of this condition start suddenly. Symptoms may last for a few days or for as long as a week.  Common symptoms include:  •Watery poop.  •Throwing up.      •Other symptoms include:  •Fever.  •Headache.  •Feeling tired (fatigue).  •Pain in the belly (abdomen).  •Chills.  •Feeling weak.  •Feeling sick to your stomach (nauseous).  •Muscle aches.  •Not feeling hungry.    How is this treated?    •This condition typically goes away on its own.  •The focus of treatment is to replace the fluids that you lose. This condition may be treated with:  •An ORS (oral rehydration solution). This is a drink that is sold at pharmacies and stores.  •Medicines to help with your symptoms.  •Probiotic supplements to reduce symptoms of diarrhea.  •Fluids given through an IV tube, if needed.  •Older adults and people with other diseases or a weak body defense system are at higher risk for not having enough water in the body.    Follow these instructions at home:    Eating and drinking  •Take an ORS as told by your doctor.  •Drink clear fluids in small amounts as you are able. Clear fluids include:  •Water.  •Ice chips.  •Fruit juice with water added to it (diluted).  •Low-calorie sports drinks.  •Drink enough fluid to keep your pee (urine) pale yellow.  •Eat small amounts of healthy foods every 3–4 hours as you are able. This may include whole grains, fruits, vegetables, lean meats, and yogurt.  •Avoid fluids that have a lot of sugar or caffeine in them, such as energy drinks, sports drinks, and soda.  •Avoid spicy or fatty foods.  •Avoid alcohol.      General instructions     •Wash your hands often. This is very important after you have watery poop or you throw up. If you cannot use soap and water, use hand .  •Make sure that all people in your home wash their hands well and often.  •Take over-the-counter and prescription medicines only as told by your doctor.  •Rest at home while you get better.  •Watch your condition for any changes.  •Take a warm bath to help with any burning or pain from having watery poop.  •Keep all follow-up visits as told by your doctor. This is important.    Contact a doctor if:  •You cannot keep fluids down.  •Your symptoms get worse.  •You have new symptoms.  •You feel light-headed.  •You feel dizzy.  •You have muscle cramps.    Get help right away if:  •You have chest pain.  •You feel very weak.  •You pass out (faint).  •You see blood in your throw-up.  •Your throw-up looks like coffee grounds.  •You have bloody or black poop (stools) or poop that looks like tar.  •You have a very bad headache, or a stiff neck, or both.  •You have a rash.  •You have very bad pain, cramping, or bloating in your belly.  •You have trouble breathing.  •You are breathing very quickly.  •You have a fast heartbeat.  •Your skin feels cold and clammy.  •You feel mixed up (confused).  •You have pain when you pee.  •You have signs of not having enough water in the body, such as:  •Dark pee, hardly any pee, or no pee.  •Cracked lips.  •Dry mouth.  •Sunken eyes.  •Feeling very sleepy.  •Feeling weak.      Summary  •Viral gastroenteritis is also known as the stomach flu.  •This condition can cause sudden watery poop (diarrhea), fever, and throwing up (vomiting).  •These germs can be passed from person to person very easily.  •Take an ORS as told by your doctor. This is a drink that is sold at pharmacies and stores.  •Drink fluids in small amounts many times each day as you are able.      This information is not intended to replace advice given to you by your health care provider. Make sure you discuss any questions you have with your health care provider.    Gastroenteritis Viral Del Adulto     La gastroenteritis viral también se conoce celine gastroenteritis. Esta condición puede afectar jordan estómago, jordan intestino huynh y jordan intestino grueso. Puede causar daksha acuosa repentina (diarrea), fiebre y vómitos. Esta condición es causada por ciertos gérmenes (virus). Estos gérmenes se pueden transmitir de persona a persona muy fácilmente (son contagiosos).    Tener daksha acuosa y vomitar puede hacer que se sienta débil y que no tenga suficiente agua en jordan cuerpo (que se deshidrate). [place] puede hacer que se sienta cansado y sediento, que tenga la boca seca y que orine con menos frecuencia. Es importante reponer los líquidos que se pierden al tener daksha acuosa y vomitar.    ¿Cuales son las causas?  •Puede enfermarse si se contagia de virus de otras personas.  •También puede enfermarse al:  •[place] alimentos, beber agua o tocar alta superficie que tenga los virus (esté contaminada).  •Compartir utensilios u otros artículos personales con alta persona enferma.    ¿Qué aumenta el riesgo?  •Tener un sistema de defensa del cuerpo débil (sistema inmunológico).  •Viviendo con rick o más niños menores de 2 años.  •Vivir en un asilo de ancianos.  •Ir en cruceros.    ¿Cuáles son los signos o síntomas?    Los síntomas de esta condición comienzan repentinamente. Los síntomas pueden durar unos días o hasta alta semana.  Los síntomas comunes incluyen:  •Cáca aguada.  •Vomitando.      •Otros síntomas incluyen:  •Fiebre.  •Dolor de charleen.  •Sentirse cansado (fatiga).  •Dolor en el vientre (abdomen).  •Escalofríos.  •Sintiéndose débil.  •Sentirse mal del estómago (náuseas).  •Tasia musculares.  •No sentir hambre.    ¿Cómo se trata esto?    •Esta condición generalmente desaparece por sí onesimo.  •El objetivo del tratamiento es reponer los líquidos que pierde. Esta condición se puede tratar con:  •Alta SRO (solución de rehidratación oral). Esta es alta bebida que se vende en farmacias y tiendas.  •Medicamentos para ayudar con merle síntomas.  •Suplementos probióticos para reducir los síntomas de la diarrea.  • Líquidos administrados a través de un tubo intravenoso, si es necesario.  •Los adultos mayores y las personas con otras enfermedades o con un sistema de defensa corporal débil tienen mayor riesgo de no tener suficiente agua en el cuerpo.    Siga estas instrucciones en casa:    Comiendo y bebiendo  •Coolin alta SRO según lo indicado por jordan médico.  •Ana líquidos cullen en pequeñas cantidades celine pueda. Los líquidos cullen incluyen:  •[place]gu[place].  •Trozos de hielo.  •Jugo de frutas con agua añadida (diluido).  •Bebidas deportivas bajas en calorías.  •Ana suficiente líquido para mantener jordan pipí (orina) de color amarillo pálido.  •Coma pequeñas cantidades de alimentos saludables cada 3 o 4 horas, según pueda. [place] puede incluir cereales integrales, frutas, verduras, lizzy magras y yogur.  •Evite los líquidos que contengan mucha azúcar o cafeína, celine bebidas energéticas, deportivas y gaseosas.  •Evite los alimentos picantes o grasosos.  •Evite el alcohol.      Instrucciones generales    •Lávese las milad con frecuencia. [place] es muy importante después de que tengas daksha acuosa o vomites. Si no puede usar agua y jabón, use desinfectante para milad.  •Asegúrese de que todas las personas en jordan hogar se laven las milad artem y con frecuencia.  •Coolin los medicamentos recetados y de venta jaime solo celine se lo indique jordan médico.  •Descanse en casa mientras se mejora.  • Vigile jordan condición para cualquier cambio.  •Coolin un baño tibio para ayudar con cualquier ardor o dolor por tener daksha acuosa.  •Asista a todas las visitas de seguimiento según lo indicado por jordan médico. [place] es importante.    Póngase en contacto con un médico si:  •No puede retener líquidos.  •Merle síntomas empeoran.  •Tiene síntomas nuevos.  •Se siente mareado.  •Se siente mareado.  •Tiene calambres musculares.    Obtenga ayuda de inmediato si:  •Tiene dolor en el pecho.  •Se siente muy débil.  •Se desmaya (desmayo).  •Ve frank en jordan vómito.  •Tu vómito parece café molido.  •Tiene heces negras o con frnak (heces) o heces que parecen alquitrán.  •Tiene un dolor de charleen muy jorge a, o rigidez en el dave, o ambos.  •Tiene un sarpullido.  •Tiene mucho dolor, calambres o hinchazón en el abdomen.  •Tiene dificultad para respirar.  •Está respirando muy rápido.  •Tiene el corazón acelerado.  •Siente la piel fría y húmeda.  •Se siente confundido (confundido).  •Tiene dolor al orinar.  •Tiene signos de no tener suficiente agua en el cuerpo, celine:  •Orina oscura, nya sin orinar o sin orinar.  •Labios agrietados.  •Boca seca.  •Ojos hundidos.  • Sensación de mucho sueño.  •Sintiéndose débil.      Resumen  •La gastroenteritis viral también se conoce celine gripe estomacal.  •Esta afección puede causar excremento acuoso (diarrea) repentino, fiebre y vómitos.  •Estos gérmenes se pueden transmitir de persona a persona muy fácilmente.  •Coolin alta SRO según lo indicado por jordan médico. Esta es alta bebida que se vende en farmacias y tiendas.  •Ana líquidos en pequeñas cantidades tantas veces al día celine pueda.      Esta información no pretende reemplazar los consejos que le brinde jordan proveedor de atención médica. Asegúrese de discutir cualquier pregunta que tenga con jordan proveedor de atención médica.

## 2023-05-14 NOTE — ED ADULT NURSE NOTE - NSFALLUNIVINTERV_ED_ALL_ED
Bed/Stretcher in lowest position, wheels locked, appropriate side rails in place/Call bell, personal items and telephone in reach/Instruct patient to call for assistance before getting out of bed/chair/stretcher/Non-slip footwear applied when patient is off stretcher/[place] to call system/Physically safe environment - no spills, clutter or unnecessary equipment/Purposeful proactive rounding/Room/bathroom lighting operational, light cord in reach
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
## 2023-08-21 ENCOUNTER — EMERGENCY (EMERGENCY)
Facility: HOSPITAL | Age: 63
LOS: 1 days | Discharge: HOME | End: 2023-08-21
Attending: EMERGENCY MEDICINE
Payer: MEDICARE

## 2023-08-21 VITALS
RESPIRATION RATE: 18 BRPM | HEIGHT: 61 IN | WEIGHT: 138.89 LBS | TEMPERATURE: 98 F | OXYGEN SATURATION: 99 % | DIASTOLIC BLOOD PRESSURE: 101 MMHG | SYSTOLIC BLOOD PRESSURE: 160 MMHG | HEART RATE: 80 BPM

## 2023-08-21 LAB
ALBUMIN SERPL ELPH-MCNC: 4.7 G/DL — SIGNIFICANT CHANGE UP (ref 3.3–5)
ALP SERPL-CCNC: 89 U/L — SIGNIFICANT CHANGE UP (ref 40–120)
ALT FLD-CCNC: 20 U/L — SIGNIFICANT CHANGE UP (ref 10–45)
ANION GAP SERPL CALC-SCNC: 14 MMOL/L — SIGNIFICANT CHANGE UP (ref 5–17)
APTT BLD: 30.4 SEC — SIGNIFICANT CHANGE UP (ref 24.5–35.6)
AST SERPL-CCNC: 15 U/L — SIGNIFICANT CHANGE UP (ref 10–40)
BASE EXCESS BLDV CALC-SCNC: 2.9 MMOL/L — SIGNIFICANT CHANGE UP (ref -2–3)
BASOPHILS # BLD AUTO: 0.08 K/UL — SIGNIFICANT CHANGE UP (ref 0–0.2)
BASOPHILS NFR BLD AUTO: 0.9 % — SIGNIFICANT CHANGE UP (ref 0–2)
BILIRUB SERPL-MCNC: 0.2 MG/DL — SIGNIFICANT CHANGE UP (ref 0.2–1.2)
BUN SERPL-MCNC: 15 MG/DL — SIGNIFICANT CHANGE UP (ref 7–23)
CA-I SERPL-SCNC: 1.17 MMOL/L — SIGNIFICANT CHANGE UP (ref 1.15–1.33)
CALCIUM SERPL-MCNC: 9.6 MG/DL — SIGNIFICANT CHANGE UP (ref 8.4–10.5)
CHLORIDE BLDV-SCNC: 104 MMOL/L — SIGNIFICANT CHANGE UP (ref 96–108)
CHLORIDE SERPL-SCNC: 102 MMOL/L — SIGNIFICANT CHANGE UP (ref 96–108)
CO2 BLDV-SCNC: 29 MMOL/L — HIGH (ref 22–26)
CO2 SERPL-SCNC: 25 MMOL/L — SIGNIFICANT CHANGE UP (ref 22–31)
CREAT SERPL-MCNC: 0.66 MG/DL — SIGNIFICANT CHANGE UP (ref 0.5–1.3)
EGFR: 98 ML/MIN/1.73M2 — SIGNIFICANT CHANGE UP
EOSINOPHIL # BLD AUTO: 0.31 K/UL — SIGNIFICANT CHANGE UP (ref 0–0.5)
EOSINOPHIL NFR BLD AUTO: 3.5 % — SIGNIFICANT CHANGE UP (ref 0–6)
GAS PNL BLDV: 137 MMOL/L — SIGNIFICANT CHANGE UP (ref 136–145)
GAS PNL BLDV: SIGNIFICANT CHANGE UP
GAS PNL BLDV: SIGNIFICANT CHANGE UP
GLUCOSE BLDV-MCNC: 144 MG/DL — HIGH (ref 70–99)
GLUCOSE SERPL-MCNC: 145 MG/DL — HIGH (ref 70–99)
HCO3 BLDV-SCNC: 28 MMOL/L — SIGNIFICANT CHANGE UP (ref 22–29)
HCT VFR BLD CALC: 41.9 % — SIGNIFICANT CHANGE UP (ref 34.5–45)
HCT VFR BLDA CALC: 46 % — SIGNIFICANT CHANGE UP (ref 34.5–46.5)
HGB BLD CALC-MCNC: 15.2 G/DL — SIGNIFICANT CHANGE UP (ref 11.7–16.1)
HGB BLD-MCNC: 14.5 G/DL — SIGNIFICANT CHANGE UP (ref 11.5–15.5)
INR BLD: 0.94 RATIO — SIGNIFICANT CHANGE UP (ref 0.85–1.18)
LACTATE BLDV-MCNC: 1.6 MMOL/L — SIGNIFICANT CHANGE UP (ref 0.5–2)
LIDOCAIN IGE QN: 33 U/L — SIGNIFICANT CHANGE UP (ref 7–60)
LYMPHOCYTES # BLD AUTO: 3.77 K/UL — HIGH (ref 1–3.3)
LYMPHOCYTES # BLD AUTO: 42.1 % — SIGNIFICANT CHANGE UP (ref 13–44)
MANUAL SMEAR VERIFICATION: SIGNIFICANT CHANGE UP
MCHC RBC-ENTMCNC: 30.6 PG — SIGNIFICANT CHANGE UP (ref 27–34)
MCHC RBC-ENTMCNC: 34.6 GM/DL — SIGNIFICANT CHANGE UP (ref 32–36)
MCV RBC AUTO: 88.4 FL — SIGNIFICANT CHANGE UP (ref 80–100)
MONOCYTES # BLD AUTO: 0.63 K/UL — SIGNIFICANT CHANGE UP (ref 0–0.9)
MONOCYTES NFR BLD AUTO: 7 % — SIGNIFICANT CHANGE UP (ref 2–14)
NEUTROPHILS # BLD AUTO: 4.09 K/UL — SIGNIFICANT CHANGE UP (ref 1.8–7.4)
NEUTROPHILS NFR BLD AUTO: 45.6 % — SIGNIFICANT CHANGE UP (ref 43–77)
PCO2 BLDV: 43 MMHG — HIGH (ref 39–42)
PH BLDV: 7.42 — SIGNIFICANT CHANGE UP (ref 7.32–7.43)
PLAT MORPH BLD: NORMAL — SIGNIFICANT CHANGE UP
PLATELET # BLD AUTO: 279 K/UL — SIGNIFICANT CHANGE UP (ref 150–400)
PO2 BLDV: 32 MMHG — SIGNIFICANT CHANGE UP (ref 25–45)
POTASSIUM BLDV-SCNC: 3.6 MMOL/L — SIGNIFICANT CHANGE UP (ref 3.5–5.1)
POTASSIUM SERPL-MCNC: 3.3 MMOL/L — LOW (ref 3.5–5.3)
POTASSIUM SERPL-SCNC: 3.3 MMOL/L — LOW (ref 3.5–5.3)
PROT SERPL-MCNC: 8.2 G/DL — SIGNIFICANT CHANGE UP (ref 6–8.3)
PROTHROM AB SERPL-ACNC: 10.4 SEC — SIGNIFICANT CHANGE UP (ref 9.5–13)
RBC # BLD: 4.74 M/UL — SIGNIFICANT CHANGE UP (ref 3.8–5.2)
RBC # FLD: 12.3 % — SIGNIFICANT CHANGE UP (ref 10.3–14.5)
RBC BLD AUTO: NORMAL — SIGNIFICANT CHANGE UP
SAO2 % BLDV: 59.6 % — LOW (ref 67–88)
SODIUM SERPL-SCNC: 141 MMOL/L — SIGNIFICANT CHANGE UP (ref 135–145)
TROPONIN T, HIGH SENSITIVITY RESULT: <6 NG/L — SIGNIFICANT CHANGE UP (ref 0–51)
VARIANT LYMPHS # BLD: 0.9 % — SIGNIFICANT CHANGE UP (ref 0–6)
WBC # BLD: 8.96 K/UL — SIGNIFICANT CHANGE UP (ref 3.8–10.5)
WBC # FLD AUTO: 8.96 K/UL — SIGNIFICANT CHANGE UP (ref 3.8–10.5)

## 2023-08-21 PROCEDURE — 76705 ECHO EXAM OF ABDOMEN: CPT | Mod: 26

## 2023-08-21 PROCEDURE — 74174 CTA ABD&PLVS W/CONTRAST: CPT | Mod: 26,MH

## 2023-08-21 PROCEDURE — 71045 X-RAY EXAM CHEST 1 VIEW: CPT | Mod: 26

## 2023-08-21 PROCEDURE — 71275 CT ANGIOGRAPHY CHEST: CPT | Mod: 26,MH

## 2023-08-21 PROCEDURE — 99285 EMERGENCY DEPT VISIT HI MDM: CPT

## 2023-08-21 RX ORDER — ONDANSETRON 8 MG/1
4 TABLET, FILM COATED ORAL ONCE
Refills: 0 | Status: COMPLETED | OUTPATIENT
Start: 2023-08-21 | End: 2023-08-21

## 2023-08-21 RX ORDER — PIPERACILLIN AND TAZOBACTAM 4; .5 G/20ML; G/20ML
3.38 INJECTION, POWDER, LYOPHILIZED, FOR SOLUTION INTRAVENOUS ONCE
Refills: 0 | Status: COMPLETED | OUTPATIENT
Start: 2023-08-21 | End: 2023-08-21

## 2023-08-21 RX ORDER — SODIUM CHLORIDE 9 MG/ML
1000 INJECTION INTRAMUSCULAR; INTRAVENOUS; SUBCUTANEOUS ONCE
Refills: 0 | Status: COMPLETED | OUTPATIENT
Start: 2023-08-21 | End: 2023-08-21

## 2023-08-21 RX ORDER — FAMOTIDINE 10 MG/ML
20 INJECTION INTRAVENOUS ONCE
Refills: 0 | Status: COMPLETED | OUTPATIENT
Start: 2023-08-21 | End: 2023-08-21

## 2023-08-21 RX ORDER — MORPHINE SULFATE 50 MG/1
4 CAPSULE, EXTENDED RELEASE ORAL ONCE
Refills: 0 | Status: DISCONTINUED | OUTPATIENT
Start: 2023-08-21 | End: 2023-08-21

## 2023-08-21 RX ADMIN — PIPERACILLIN AND TAZOBACTAM 200 GRAM(S): 4; .5 INJECTION, POWDER, LYOPHILIZED, FOR SOLUTION INTRAVENOUS at 22:08

## 2023-08-21 RX ADMIN — Medication 30 MILLILITER(S): at 21:32

## 2023-08-21 RX ADMIN — SODIUM CHLORIDE 1000 MILLILITER(S): 9 INJECTION INTRAMUSCULAR; INTRAVENOUS; SUBCUTANEOUS at 21:33

## 2023-08-21 RX ADMIN — FAMOTIDINE 20 MILLIGRAM(S): 10 INJECTION INTRAVENOUS at 21:33

## 2023-08-21 RX ADMIN — MORPHINE SULFATE 4 MILLIGRAM(S): 50 CAPSULE, EXTENDED RELEASE ORAL at 20:20

## 2023-08-21 RX ADMIN — ONDANSETRON 4 MILLIGRAM(S): 8 TABLET, FILM COATED ORAL at 20:18

## 2023-08-21 NOTE — ED PROVIDER NOTE - PROGRESS NOTE DETAILS
Meryl: ct showing possible acalculus santo. will get dedicated u/s gallbadder, reassess Fernando PGY3  US did not show acute santo. Discussed with daughter and patient at bedside - agreeable with discharge with GI referral. Will send PPI.

## 2023-08-21 NOTE — ED ADULT NURSE NOTE - OBJECTIVE STATEMENT
Pt is a 62y F no PMH brought into ED by daughter c/o severe epigastric pain radiating to her back. Pt daughter states pt called her at 1830 reporting she was in severe pain, vomited once, and her daughter went home to find her hunched over in pain. Pt prefers daughter to translate at bedside for her, Latvian speaking. Pt is awake & oriented, able to answer questions, tachypneic breathing. Pt is ambulatory with steady gait at baseline, independent. Pt lying in stretcher, locked and in lowest position, with appropriate side rails raised.

## 2023-08-21 NOTE — ED PROVIDER NOTE - CLINICAL SUMMARY MEDICAL DECISION MAKING FREE TEXT BOX
62-year-old female, no medical history, no surgical history, presenting with 1 day onset of epigastric pain rating to the back.  Vomited x1.  No fever, diarrhea, urinary symptoms.  Active smoker.  No alcohol use.  History was obtained via Lao translation by daughter Polyeth. 62-year-old female, no medical history, no surgical history, presenting with 1 day onset of epigastric pain rating to the back.  Vomited x1.  No fever, diarrhea, urinary symptoms.  Active smoker.  No alcohol use.  History was obtained via Estonian translation by daughter Polyeth. /101, otherwise vital signs within normal limits on arrival.  Physical exam as noted above patient is in moderate distress due to pain, normal respiratory effort, clear lung exam bilaterally, epigastric tenderness on palpation noted.  No leg swelling noted.  Differentials include but not limited to pancreatitis versus acute cholecystitis versus aortic dissection versus gastritis.  Will get labs, CT angio chest abdomen pelvis, ultrasound gallbladder, pain control, GI cocktail, reassess. 62-year-old female, no medical history, no surgical history, presenting with 1 day onset of epigastric pain rating to the back.  Vomited x1.  No fever, diarrhea, urinary symptoms.  Active smoker.  No alcohol use.  History was obtained via Uzbek translation by daughter Polyeth. /101, otherwise vital signs within normal limits on arrival.  Physical exam as noted above patient is in moderate distress due to pain, normal respiratory effort, clear lung exam bilaterally, epigastric tenderness on palpation noted.  No leg swelling noted.  Differentials include but not limited to pancreatitis versus acute cholecystitis versus aortic dissection versus gastritis.  Will get labs, CT angio chest abdomen pelvis, ultrasound gallbladder, pain control, GI cocktail, reassess.  Meryl: 62 year old female with a few hours of epigastric pain radiating to the back. one episode of vomiting. att exam: patient awake alert mild distress. LUNGS CTAB no wheeze no crackle. CARD RRR no m/r/g.  Abdomen soft ttp epigastrium no rebound no guarding no CVA tenderness. EXT WWP no edema no calf tenderness CV 2+DP/PT bilaterally. neuro A&Ox3 gait normal.  skin warm and dry no rash. bedside u/s done, no AAA, will get labs, pain control, ivf, cta r/o dissection, reassess

## 2023-08-21 NOTE — ED PROVIDER NOTE - PATIENT PORTAL LINK FT
You can access the FollowMyHealth Patient Portal offered by [facility] by registering at the following website: [email or web address]. By joining [employer]’s FollowMyHealth portal, you will also be able to view your health information using other applications (apps) compatible with our system.

## 2023-08-21 NOTE — ED PROVIDER NOTE - NS ED ROS FT
Constitutional:  (-) fever, (-) chills, (-) lethargy  Eyes:  (-) eye pain (-) visual changes  ENMT: (-) nasal discharge, (-) sore throat. (-) neck pain or stiffness  Cardiac: (-) chest pain (-) palpitations  Respiratory:  (-) cough (-) respiratory distress.   GI:  (-) nausea (+) vomiting (-) diarrhea (+) abdominal pain.  :  (-) dysuria (-) frequency (-) burning.  MS:  (-) back pain (-) joint pain.  Neuro:  (-) headache (-) numbness (-) tingling (-) focal weakness  Skin:  (-) rash  Except as documented in the HPI,  all other systems are negative

## 2023-08-21 NOTE — ED ADULT NURSE NOTE - LANGUAGE ASSISTANCE NEEDED
No-Patient/Caregiver offered and refused free interpretation services. EOAE (evoked otoacoustic emission)

## 2023-08-21 NOTE — ED PROVIDER NOTE - PHYSICAL EXAMINATION
Gen: Patient is in moderate distress due to pain, AAOx3  HEENT: NCAT,  normal conjunctiva, tongue midline, oral mucosa moist  Lung: CTAB, no respiratory distress, no wheezes/rhonchi/rales B/L, speaking in full sentences  CV: RRR, no murmurs, rubs or gallops, distal pulses 2+ b/l  Abd: soft, epigastric tenderness on palpation, ND, no guarding, no rigidity, no rebound tenderness, no CVA tenderness   MSK: no visible deformities, ROM normal in UE/LE  Neuro: No focal sensory or motor deficits  Skin: Warm, well perfused, no rash, no leg swelling  Psych: normal affect, calm

## 2023-08-21 NOTE — ED PROVIDER NOTE - OBJECTIVE STATEMENT
62-year-old female, no medical history, no surgical history, presenting with 1 day onset of epigastric pain rating to the back.  Vomited x1.  No fever, diarrhea, urinary symptoms.  Active smoker.  No alcohol use.  History was obtained via Citizen of [place] translation by daughter Polyeth. 62-year-old female, no medical history, no surgical history, presenting with onset of few hours epigastric pain radiating to the back.  Vomited x1.  No fever, diarrhea, urinary symptoms.  Active smoker.  No alcohol use.  History was obtained via Sami translation by daughter Polyeth.

## 2023-08-21 NOTE — ED ADULT NURSE NOTE - CAS TRG GENERAL NORM CIRC DET
Strong peripheral pulses/Capillary refill less/equal to 2 seconds Topical Metronidazole Pregnancy And Lactation Text: This medication is Pregnancy Category B and considered safe during pregnancy.  It is also considered safe to use while breastfeeding.

## 2023-08-21 NOTE — ED PROVIDER NOTE - NSFOLLOWUPINSTRUCTIONS_ED_ALL_ED_FT
/101, otherwise vital signs within normal limits on arrival.  Physical exam as noted above patient is in moderate distress due to pain, normal respiratory effort, clear lung exam bilaterally, epigastric tenderness on palpation noted.  No leg swelling noted.  Differentials include but not limited to pancreatitis versus acute cholecystitis versus aortic dissection versus gastritis.  Will get labs, CT angio chest abdomen pelvis, ultrasound gallbladder, pain control, GI cocktail, reassess. You were seen in the emergency department for epigastric pain.   Your workup in the emergency department includes bloodwork, ultrasound of gallbladder and CT scan of abdomen and pelvis.   You can find the results of all the tests in this discharge packet.   Please follow up with your primary care doctor within 48 hours for continuation of care.   Please follow up with GI doctor within 1-2 weeks for further management.   Return to the emergency department if you experience any new/concerning/worsening symptoms such as but not limited to: fever (>100.3F), intractable nausea, vomiting, chest pain, shortness of breath, worsening abdominal pain.     You are prescribed:  1) Pantoprazole: take 1 tablet once a day

## 2023-08-22 VITALS
OXYGEN SATURATION: 100 % | TEMPERATURE: 99 F | DIASTOLIC BLOOD PRESSURE: 86 MMHG | SYSTOLIC BLOOD PRESSURE: 148 MMHG | RESPIRATION RATE: 16 BRPM | HEART RATE: 74 BPM

## 2023-08-22 PROCEDURE — 83605 ASSAY OF LACTIC ACID: CPT

## 2023-08-22 PROCEDURE — 82435 ASSAY OF BLOOD CHLORIDE: CPT

## 2023-08-22 PROCEDURE — 85018 HEMOGLOBIN: CPT

## 2023-08-22 PROCEDURE — 71045 X-RAY EXAM CHEST 1 VIEW: CPT

## 2023-08-22 PROCEDURE — 99285 EMERGENCY DEPT VISIT HI MDM: CPT | Mod: 25

## 2023-08-22 PROCEDURE — 85610 PROTHROMBIN TIME: CPT

## 2023-08-22 PROCEDURE — 85730 THROMBOPLASTIN TIME PARTIAL: CPT

## 2023-08-22 PROCEDURE — 83690 ASSAY OF LIPASE: CPT

## 2023-08-22 PROCEDURE — 80053 COMPREHEN METABOLIC PANEL: CPT

## 2023-08-22 PROCEDURE — 96375 TX/PRO/DX INJ NEW DRUG ADDON: CPT | Mod: XU

## 2023-08-22 PROCEDURE — 85025 COMPLETE CBC W/AUTO DIFF WBC: CPT

## 2023-08-22 PROCEDURE — 82803 BLOOD GASES ANY COMBINATION: CPT

## 2023-08-22 PROCEDURE — 84484 ASSAY OF TROPONIN QUANT: CPT

## 2023-08-22 PROCEDURE — 82330 ASSAY OF CALCIUM: CPT

## 2023-08-22 PROCEDURE — 76705 ECHO EXAM OF ABDOMEN: CPT

## 2023-08-22 PROCEDURE — 87040 BLOOD CULTURE FOR BACTERIA: CPT

## 2023-08-22 PROCEDURE — 85014 HEMATOCRIT: CPT

## 2023-08-22 PROCEDURE — 84132 ASSAY OF SERUM POTASSIUM: CPT

## 2023-08-22 PROCEDURE — 84295 ASSAY OF SERUM SODIUM: CPT

## 2023-08-22 PROCEDURE — 93005 ELECTROCARDIOGRAM TRACING: CPT

## 2023-08-22 PROCEDURE — 82947 ASSAY GLUCOSE BLOOD QUANT: CPT

## 2023-08-22 PROCEDURE — 96374 THER/PROPH/DIAG INJ IV PUSH: CPT | Mod: XU

## 2023-08-22 PROCEDURE — 74174 CTA ABD&PLVS W/CONTRAST: CPT | Mod: MH

## 2023-08-22 PROCEDURE — 71275 CT ANGIOGRAPHY CHEST: CPT | Mod: MH

## 2023-08-22 RX ORDER — PANTOPRAZOLE SODIUM 20 MG/1
1 TABLET, DELAYED RELEASE ORAL
Qty: 28 | Refills: 0
Start: 2023-08-22 | End: 2023-09-18

## 2023-08-22 RX ORDER — ACETAMINOPHEN 500 MG
1000 TABLET ORAL ONCE
Refills: 0 | Status: COMPLETED | OUTPATIENT
Start: 2023-08-22 | End: 2023-08-22

## 2023-08-22 RX ADMIN — Medication 400 MILLIGRAM(S): at 02:33

## 2023-08-22 NOTE — ED POST DISCHARGE NOTE - RESULT SUMMARY
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

## 2023-08-22 NOTE — ED POST DISCHARGE NOTE - DETAILS
8/22: Spoke with pts daughter, never has complained of back pain.  Likely normal variant on CT, recommended discussing with PCP when they follow up for possible MRI.  [place]

## 2023-08-27 LAB
CULTURE RESULTS: SIGNIFICANT CHANGE UP
CULTURE RESULTS: SIGNIFICANT CHANGE UP
SPECIMEN SOURCE: SIGNIFICANT CHANGE UP
SPECIMEN SOURCE: SIGNIFICANT CHANGE UP

## 2024-09-23 NOTE — ED PROVIDER NOTE - CHILD ABUSE FACILITY
[FreeTextEntry1] : 34 yo female presents for high risk screening in the setting of a pathogenic mutation in BRCA2.  The patient was diagnosed within the past month by her GYN.  The patient was being worked up to undergo a partial hysterectomy for endometriosis.  Due to the family history, she underwent genetic testing which returned as positive for pathogenic mutation in BRCA2. We discussed the implications of BRCA mutations at length. The patient understands that they have approximately 60 to 80% risk of developing breast cancer as well as up to 50% risk of ovarian cancer. There is also a significant risk of a second breast cancer. Close surveillance with screening mammography with sonography and breast MRI are recommended for followup beginning at age 25 or at time of diagnosis.     With BRCA patients, there are number of options for breast cancer risk reduction including bilateral risk-reducing oophorectomy, which can reduce the risk of ovarian and fallopian tube cancer up to 95% and could also reduce breast cancer risk by  approximately 50%.  An additional form of risk reduction is bilateral prophylactic mastectomy. This has be shown to reduce breast cancer risk by up to 95%. Tamoxifen use can also reduce breast cancer risk. The majority of BRCA1 associated tumors are not hormone-sensitive, and there are no recommended chemoprevention options.   The patient is recommended to follow with a breast oncologist as well as gynecologist oncologist. The gynecologic oncologist usually recommends prophylactic oophorectomy by age 40 or when childbearing is completed, which ever comes sooner. It is recommended children of a documented carrier be tested in their early 20s. We discussed long-term surveillance and discussed that prophylactic mastectomy and oophorectomy are risk reducing and not risk eliminating surgeries.  After surgery, the patient is still recommended to be followed by their gynecologic oncologist and their breast oncologist annually.   The patient states that her GYN was concerned about moving forward with the surgery unless cleared by breast pustulous.  The patient states that she was curious if the effect on estrogen would affect her risk of breast cancer.  I did state that they would not be a contraindication to partial hysterectomy in the setting of a BRCA diagnosis.  I did recommend that she meet with a gynecologist oncologist as well as her regular GYN.  The patient is amenable.  She would like to proceed with close monitoring with mammogram, sonogram and MRI. [facility]

## 2024-11-21 ENCOUNTER — INPATIENT (INPATIENT)
Facility: HOSPITAL | Age: 64
LOS: 7 days | Discharge: HOME | DRG: 392 | End: 2024-11-29
Attending: TRANSPLANT SURGERY | Admitting: INTERNAL MEDICINE
Payer: MEDICARE

## 2024-11-21 VITALS
WEIGHT: 139.99 LBS | RESPIRATION RATE: 20 BRPM | SYSTOLIC BLOOD PRESSURE: 146 MMHG | TEMPERATURE: 98 F | HEART RATE: 99 BPM | OXYGEN SATURATION: 97 % | HEIGHT: 65 IN | DIASTOLIC BLOOD PRESSURE: 87 MMHG

## 2024-11-21 DIAGNOSIS — R10.13 EPIGASTRIC PAIN: ICD-10-CM

## 2024-11-21 LAB
ALBUMIN SERPL ELPH-MCNC: 4.6 G/DL — SIGNIFICANT CHANGE UP (ref 3.3–5)
ALP SERPL-CCNC: 92 U/L — SIGNIFICANT CHANGE UP (ref 40–120)
ALT FLD-CCNC: 28 U/L — SIGNIFICANT CHANGE UP (ref 10–45)
ANION GAP SERPL CALC-SCNC: 15 MMOL/L — SIGNIFICANT CHANGE UP (ref 5–17)
AST SERPL-CCNC: 23 U/L — SIGNIFICANT CHANGE UP (ref 10–40)
BASOPHILS # BLD AUTO: 0.03 K/UL — SIGNIFICANT CHANGE UP (ref 0–0.2)
BASOPHILS NFR BLD AUTO: 0.3 % — SIGNIFICANT CHANGE UP (ref 0–2)
BILIRUB SERPL-MCNC: 0.4 MG/DL — SIGNIFICANT CHANGE UP (ref 0.2–1.2)
BUN SERPL-MCNC: 20 MG/DL — SIGNIFICANT CHANGE UP (ref 7–23)
CALCIUM SERPL-MCNC: 9.7 MG/DL — SIGNIFICANT CHANGE UP (ref 8.4–10.5)
CHLORIDE SERPL-SCNC: 103 MMOL/L — SIGNIFICANT CHANGE UP (ref 96–108)
CK MB BLD-MCNC: 1 % — SIGNIFICANT CHANGE UP (ref 0–3.5)
CK MB CFR SERPL CALC: 1.2 NG/ML — SIGNIFICANT CHANGE UP (ref 0–3.8)
CK SERPL-CCNC: 117 U/L — SIGNIFICANT CHANGE UP (ref 25–170)
CO2 SERPL-SCNC: 21 MMOL/L — LOW (ref 22–31)
CREAT SERPL-MCNC: 0.63 MG/DL — SIGNIFICANT CHANGE UP (ref 0.5–1.3)
EGFR: 98 ML/MIN/1.73M2 — SIGNIFICANT CHANGE UP
EOSINOPHIL # BLD AUTO: 0.03 K/UL — SIGNIFICANT CHANGE UP (ref 0–0.5)
EOSINOPHIL NFR BLD AUTO: 0.3 % — SIGNIFICANT CHANGE UP (ref 0–6)
GLUCOSE SERPL-MCNC: 139 MG/DL — HIGH (ref 70–99)
HCT VFR BLD CALC: 41.7 % — SIGNIFICANT CHANGE UP (ref 34.5–45)
HGB BLD-MCNC: 14.3 G/DL — SIGNIFICANT CHANGE UP (ref 11.5–15.5)
IMM GRANULOCYTES NFR BLD AUTO: 0.3 % — SIGNIFICANT CHANGE UP (ref 0–0.9)
LIDOCAIN IGE QN: 20 U/L — SIGNIFICANT CHANGE UP (ref 7–60)
LYMPHOCYTES # BLD AUTO: 1.4 K/UL — SIGNIFICANT CHANGE UP (ref 1–3.3)
LYMPHOCYTES # BLD AUTO: 14.5 % — SIGNIFICANT CHANGE UP (ref 13–44)
MAGNESIUM SERPL-MCNC: 2.4 MG/DL — SIGNIFICANT CHANGE UP (ref 1.6–2.6)
MCHC RBC-ENTMCNC: 31.2 PG — SIGNIFICANT CHANGE UP (ref 27–34)
MCHC RBC-ENTMCNC: 34.3 G/DL — SIGNIFICANT CHANGE UP (ref 32–36)
MCV RBC AUTO: 91 FL — SIGNIFICANT CHANGE UP (ref 80–100)
MONOCYTES # BLD AUTO: 0.29 K/UL — SIGNIFICANT CHANGE UP (ref 0–0.9)
MONOCYTES NFR BLD AUTO: 3 % — SIGNIFICANT CHANGE UP (ref 2–14)
NEUTROPHILS # BLD AUTO: 7.9 K/UL — HIGH (ref 1.8–7.4)
NEUTROPHILS NFR BLD AUTO: 81.6 % — HIGH (ref 43–77)
NRBC # BLD: 0 /100 WBCS — SIGNIFICANT CHANGE UP (ref 0–0)
PLATELET # BLD AUTO: 276 K/UL — SIGNIFICANT CHANGE UP (ref 150–400)
POTASSIUM SERPL-MCNC: 3.8 MMOL/L — SIGNIFICANT CHANGE UP (ref 3.5–5.3)
POTASSIUM SERPL-SCNC: 3.8 MMOL/L — SIGNIFICANT CHANGE UP (ref 3.5–5.3)
PROT SERPL-MCNC: 8.4 G/DL — HIGH (ref 6–8.3)
RBC # BLD: 4.58 M/UL — SIGNIFICANT CHANGE UP (ref 3.8–5.2)
RBC # FLD: 12.3 % — SIGNIFICANT CHANGE UP (ref 10.3–14.5)
SODIUM SERPL-SCNC: 139 MMOL/L — SIGNIFICANT CHANGE UP (ref 135–145)
TROPONIN T, HIGH SENSITIVITY RESULT: <6 NG/L — SIGNIFICANT CHANGE UP (ref 0–51)
TROPONIN T, HIGH SENSITIVITY RESULT: <6 NG/L — SIGNIFICANT CHANGE UP (ref 0–51)
WBC # BLD: 9.68 K/UL — SIGNIFICANT CHANGE UP (ref 3.8–10.5)
WBC # FLD AUTO: 9.68 K/UL — SIGNIFICANT CHANGE UP (ref 3.8–10.5)

## 2024-11-21 PROCEDURE — 76705 ECHO EXAM OF ABDOMEN: CPT | Mod: 26

## 2024-11-21 PROCEDURE — 71046 X-RAY EXAM CHEST 2 VIEWS: CPT | Mod: 26

## 2024-11-21 PROCEDURE — 99285 EMERGENCY DEPT VISIT HI MDM: CPT | Mod: GC

## 2024-11-21 PROCEDURE — 74177 CT ABD & PELVIS W/CONTRAST: CPT | Mod: 26,MC

## 2024-11-21 RX ORDER — PANTOPRAZOLE SODIUM 40 MG/1
40 TABLET, DELAYED RELEASE ORAL
Refills: 0 | Status: DISCONTINUED | OUTPATIENT
Start: 2024-11-21 | End: 2024-11-27

## 2024-11-21 RX ORDER — ONDANSETRON HYDROCHLORIDE 4 MG/1
4 TABLET, FILM COATED ORAL ONCE
Refills: 0 | Status: COMPLETED | OUTPATIENT
Start: 2024-11-21 | End: 2024-11-21

## 2024-11-21 RX ORDER — ACETAMINOPHEN 500MG 500 MG/1
650 TABLET, COATED ORAL EVERY 6 HOURS
Refills: 0 | Status: DISCONTINUED | OUTPATIENT
Start: 2024-11-21 | End: 2024-11-27

## 2024-11-21 RX ORDER — HEPARIN SODIUM,PORCINE 1000/ML
5000 VIAL (ML) INJECTION EVERY 8 HOURS
Refills: 0 | Status: DISCONTINUED | OUTPATIENT
Start: 2024-11-21 | End: 2024-11-27

## 2024-11-21 RX ORDER — ACETAMINOPHEN 500MG 500 MG/1
1000 TABLET, COATED ORAL ONCE
Refills: 0 | Status: COMPLETED | OUTPATIENT
Start: 2024-11-21 | End: 2024-11-21

## 2024-11-21 RX ORDER — FAMOTIDINE 20 MG/1
20 TABLET, FILM COATED ORAL ONCE
Refills: 0 | Status: COMPLETED | OUTPATIENT
Start: 2024-11-21 | End: 2024-11-21

## 2024-11-21 RX ADMIN — Medication 4 MILLIGRAM(S): at 12:49

## 2024-11-21 RX ADMIN — ACETAMINOPHEN 500MG 1000 MILLIGRAM(S): 500 TABLET, COATED ORAL at 13:07

## 2024-11-21 RX ADMIN — Medication 4 MILLIGRAM(S): at 16:41

## 2024-11-21 RX ADMIN — Medication 4 MILLIGRAM(S): at 13:49

## 2024-11-21 RX ADMIN — ACETAMINOPHEN 500MG 400 MILLIGRAM(S): 500 TABLET, COATED ORAL at 12:07

## 2024-11-21 RX ADMIN — Medication 4 MILLIGRAM(S): at 17:41

## 2024-11-21 RX ADMIN — ONDANSETRON HYDROCHLORIDE 4 MILLIGRAM(S): 4 TABLET, FILM COATED ORAL at 12:07

## 2024-11-21 RX ADMIN — FAMOTIDINE 20 MILLIGRAM(S): 20 TABLET, FILM COATED ORAL at 12:07

## 2024-11-21 NOTE — H&P ADULT - NSHPLABSRESULTS_GEN_ALL_CORE
14.3   9.68  )-----------( 276      ( 21 Nov 2024 11:47 )             41.7       11-21    139  |  103  |  20  ----------------------------<  139[H]  3.8   |  21[L]  |  0.63    Ca    9.7      21 Nov 2024 11:47  Mg     2.4     11-21    TPro  8.4[H]  /  Alb  4.6  /  TBili  0.4  /  DBili  x   /  AST  23  /  ALT  28  /  AlkPhos  92  11-21              Urinalysis Basic - ( 21 Nov 2024 11:47 )    Color: x / Appearance: x / SG: x / pH: x  Gluc: 139 mg/dL / Ketone: x  / Bili: x / Urobili: x   Blood: x / Protein: x / Nitrite: x   Leuk Esterase: x / RBC: x / WBC x   Sq Epi: x / Non Sq Epi: x / Bacteria: x    < from: CT Abdomen and Pelvis w/ IV Cont (11.21.24 @ 15:27) >    IMPRESSION:  Mild extrahepatic biliary ductal dilatation with normal distal tapering   at the ampulla, unchanged from 8/21/2023. 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 Correlate with bilirubin levels and if clinically warranted,   more definitive characterization may be obtained with a contrast enhanced   MRI/MRCP.    < end of copied text >    < from: Xray Chest 2 Views PA/Lat (11.21.24 @ 11:56) >    IMPRESSION: Clear lungs.    EKG SR T inv inf leads

## 2024-11-21 NOTE — ED PROVIDER NOTE - OBJECTIVE STATEMENT
64F no PMH here with epigastric pain radiating to the back since 0700 today. Pain is constant and worse w eating assoc w nausea and few episodes of emesis. NO fevers. No hematemesis. No diarrhea, No fevers. Not on any meds, No EtOH. No prior abd surg.

## 2024-11-21 NOTE — H&P ADULT - NSHPSOCIALHISTORY_GEN_ALL_CORE
Social History:    Marital Status:  (   )    ( x  ) Single    (   )    (  )   Occupation:   Lives with: (  ) alone  (x  ) children   (  ) spouse   (  ) parents  (  ) other    Substance Use (street drugs): (  x) never used  (  ) other:  Tobacco Usage:  ( x  ) never smoked   (   ) former smoker   (   ) current smoker  (     ) pack years  (        ) last cigarette date  Alcohol Usage: no    (     ) Advanced Directives: (     ) None    (      ) DNR    (     ) DNI    (     ) Health Care Proxy:

## 2024-11-21 NOTE — H&P ADULT - NSHPREVIEWOFSYSTEMS_GEN_ALL_CORE
REVIEW OF SYSTEMS:    CONSTITUTIONAL: No weakness, fevers, chills  EYES/ENT: No visual changes;  No vertigo or throat pain   NECK: No pain or stiffness  RESPIRATORY: No cough, wheezing, hemoptysis; No shortness of breath  CARDIOVASCULAR: No chest pain or palpitations  GASTROINTESTINAL: +  abdominal and epigastric pain. No nausea, vomiting, or hematemesis; No diarrhea or constipation. No melena or hematochezia.  GENITOURINARY: No dysuria, frequency or hematuria  NEUROLOGICAL: No numbness or weakness  SKIN: No itching, burning, rashes, or lesions   All other review of systems is negative unless indicated above.

## 2024-11-21 NOTE — ED PROVIDER NOTE - PROGRESS NOTE DETAILS
MD Nova (PGY-3) patient imaging reviewed.  Mildly dilated extrahepatic common bile duct measuring 0.9 with normal distal tapering.  Adjacent periampullary duodenal diverticulum also noted.  Patient with continued pain despite medications.  Patient to be admitted for further evaluation MD Nova (PGY-3) surgery consulted as per hospitalist request.  To see patient.

## 2024-11-21 NOTE — ED PROVIDER NOTE - PHYSICAL EXAMINATION
Gen: WNWD uncomf appearing   HEENT: NCAT  EOMI no scleral icterus normal pharynx  Neck: supple  CV: RRR, no murmur  Lung: CTA BL  Abd: +BS soft ND epigastric and RUQ TTP no grr   Back: No CVAT   Ext: wwp, FROMx4, no edema   Neuro: no fnd

## 2024-11-21 NOTE — H&P ADULT - NSHPPHYSICALEXAM_GEN_ALL_CORE
PHYSICAL EXAMINATION:  Vital Signs Last 24 Hrs  T(C): 36.9 (21 Nov 2024 10:42), Max: 36.9 (21 Nov 2024 10:42)  T(F): 98.4 (21 Nov 2024 10:42), Max: 98.4 (21 Nov 2024 10:42)  HR: 91 (21 Nov 2024 16:45) (87 - 99)  BP: 138/83 (21 Nov 2024 16:45) (138/83 - 148/62)  BP(mean): --  RR: 20 (21 Nov 2024 16:45) (18 - 20)  SpO2: 97% (21 Nov 2024 16:45) (97% - 99%)    Parameters below as of 21 Nov 2024 16:45  Patient On (Oxygen Delivery Method): room air      CAPILLARY BLOOD GLUCOSE          GENERAL: NAD   HEAD:  atraumatic, normocephalic  EYES: sclera anicteric  ENMT: mucous membranes moist  NECK: supple, No JVD  CHEST/LUNG: clear to auscultation bilaterally; no rales, rhonchi, or wheezing b/l  HEART: normal S1, S2  ABDOMEN: BS+, soft, ND, RUQ and epigastric tender   EXTREMITIES:  pulses palpable; no clubbing, cyanosis, or edema b/l LEs  NEURO: awake, alert, interactive; moves all extremities  SKIN: no rashes or lesions

## 2024-11-21 NOTE — PROGRESS NOTE ADULT - SUBJECTIVE AND OBJECTIVE BOX
FELICIANO [id number]  64yFemale  T(C): 36.9 (11-21-24 @ 10:42), Max: 36.9 (11-21-24 @ 10:42)  HR: 91 (11-21-24 @ 16:45) (87 - 99)  BP: 138/83 (11-21-24 @ 16:45) (138/83 - 148/62)  RR: 20 (11-21-24 @ 16:45) (18 - 20)  SpO2: 97% (11-21-24 @ 16:45) (97% - 99%)  Wt(kg): --    chart reviewed d/w dr. tran full consult to follow

## 2024-11-21 NOTE — ED ADULT NURSE NOTE - OBJECTIVE STATEMENT
64 year old female presented to ED with c/o of epigastric pain since 7am today with nausea/vomiting. no past medical history no past surgical history. pt denies CP, SOB, numbness/tingling, fever, cough, chills, dizziness, headache, blurred vision, neuro intact. pt a&ox3, lung sounds clear, heart rate regular, abdomen soft nontender nondistended to palp. skin intact. IV in RAC 20G and patent. Will continue to monitor and assess while offering support and reassurance.

## 2024-11-21 NOTE — H&P ADULT - SOCIAL HISTORY
-- Message is from the [facility]--    Reason for Call: Mom is calling to make patients 18 month old well visit but cannot come in any other day then 7/12/19 which would be 3 days prior to patient turning 18 months old, is that okay?    Caller Information       Type Contact Phone    06/24/2019 11:19 AM Phone (Incoming) PERKINS,DEVIN (Mother) [phone] (H)          Alternative phone number: no    Turnaround time given to caller:   \"This message will be sent to [state Provider's name]. The clinical team will fulfill your request as soon as they review your message.\"    
No

## 2024-11-21 NOTE — CONSULT NOTE ADULT - ASSESSMENT
64F with no significant PMH presents with epigastric pain radiating to the back since the morning. CT shows mild CBD dilation of 9mm and a duodenal diverticulum    Recommendations   - recommend MRCP  - pain control PRN   - nausea control PRN  - trend LFTs  - appreciate care per primary team     Plan discussed with attending Dr. Campbell     Trauma/ACS  [phone]

## 2024-11-21 NOTE — H&P ADULT - ASSESSMENT
64 f with    Abdominal pain  - MRCP  - pain control  - GI evaluation DR Loya  - Surgical evaluation called by ER  - clears    EKG changes  - cardiac enzymes  - Echo  - Cardiology evaluation Dr Chanel    DVT prohylaxis    Further action as per clinical course     d/w patient, daughter    Rad Ramos MD phone [phone]

## 2024-11-21 NOTE — ED PROVIDER NOTE - CLINICAL SUMMARY MEDICAL DECISION MAKING FREE TEXT BOX
64F no PMH here with epigastric pain radiating to the back since 0700 today. Pain is constant and worse w eating assoc w nausea and few episodes of emesis. Ddx includes but is not limited to: PUD, GERD, gastritis, acute chol, biliary colic, pancreatitis, less likely a cardiac equivalent.

## 2024-11-21 NOTE — ED ADULT NURSE NOTE - PRIMARY CARE PROVIDER
Problem: Sensorimotor Impairment (Stroke, Ischemic/Transient Ischemic Attack)  Goal: Improved Sensorimotor Function  Outcome: Ongoing, Progressing     Trev continues to be neurologically intact except mild facial symmetry. Walked in hallway for about 10 minutes with stand-by assist and steady gait. Dull headache unchanged after Tylenol. Blood pressure stable without intervention. Tolerating regular diet.   
Major Shift Events:  A&Ox4, moves all extremities spontaneously. Slight lt sided extremity weakness. Pupils ERR. Slight dull headache, denies PRN tylenol. Slight lt sided facial droop at baseline. SB 30-50s, SBP <180 w/o intervention. RA, LS clear. Voiding difficulty at baseline, scheduled flomax. Bladder scan q4h for rentention. Regular diet. Wife at bedside.    Plan: Qh neuro checks until 0830. Continue current plan of care.    For vital signs and complete assessments, please see documentation flowsheets.    
Occupational Therapy Discharge Summary    Reason for therapy discharge:    Discharged to home.    Progress towards therapy goal(s). See goals on Care Plan in [facility] electronic health record for goal details.  Goals met    Therapy recommendation(s):    No further therapy is recommended. Pt presenting near baseline function for ADL tasks, functional mobility, and activity tolerance. Recommending home. No further acute OT needs identified, will complete orders.                     
Time: 1900 - 0730    Goal Outcome Evaluation:    Plan of Care Reviewed With: patient     Overall Patient Progress: no change    Major Shift Events: Pt still neurologically intact, no acute changes. Headache remains unchanged; dull, occipital. Pt still exhibiting slight L facial droop. Systolic BP's remained in 120's with no need for intervention. Tele sinus david, intermittently as low as upper 30's; pt asymptomatic. Urinary hesitancy, able to void spontaneously using urinal, on scheduled Flomax. No BM this shift.     Plan: Plan for brain MRI (checklist sent) and echo today. Continue to closely monitor neurological status.     For vital signs and complete assessments, please see documentation flowsheets.              
unknown

## 2024-11-21 NOTE — ED ADULT NURSE NOTE - NSFALLUNIVINTERV_ED_ALL_ED
Bed/Stretcher in lowest position, wheels locked, appropriate side rails in place/Call bell, personal items and telephone in reach/Instruct patient to call for assistance before getting out of bed/chair/stretcher/Non-slip footwear applied when patient is off stretcher/[place] to call system/Physically safe environment - no spills, clutter or unnecessary equipment/Purposeful proactive rounding/Room/bathroom lighting operational, light cord in reach

## 2024-11-22 ENCOUNTER — RESULT REVIEW (OUTPATIENT)
Age: 64
End: 2024-11-22

## 2024-11-22 LAB
ANION GAP SERPL CALC-SCNC: 15 MMOL/L — SIGNIFICANT CHANGE UP (ref 5–17)
BUN SERPL-MCNC: 23 MG/DL — SIGNIFICANT CHANGE UP (ref 7–23)
CALCIUM SERPL-MCNC: 9.5 MG/DL — SIGNIFICANT CHANGE UP (ref 8.4–10.5)
CHLORIDE SERPL-SCNC: 103 MMOL/L — SIGNIFICANT CHANGE UP (ref 96–108)
CO2 SERPL-SCNC: 22 MMOL/L — SIGNIFICANT CHANGE UP (ref 22–31)
CREAT SERPL-MCNC: 0.56 MG/DL — SIGNIFICANT CHANGE UP (ref 0.5–1.3)
EGFR: 101 ML/MIN/1.73M2 — SIGNIFICANT CHANGE UP
GLUCOSE SERPL-MCNC: 110 MG/DL — HIGH (ref 70–99)
HCT VFR BLD CALC: 42.2 % — SIGNIFICANT CHANGE UP (ref 34.5–45)
HGB BLD-MCNC: 14.6 G/DL — SIGNIFICANT CHANGE UP (ref 11.5–15.5)
MCHC RBC-ENTMCNC: 31.2 PG — SIGNIFICANT CHANGE UP (ref 27–34)
MCHC RBC-ENTMCNC: 34.6 G/DL — SIGNIFICANT CHANGE UP (ref 32–36)
MCV RBC AUTO: 90.2 FL — SIGNIFICANT CHANGE UP (ref 80–100)
NRBC # BLD: 0 /100 WBCS — SIGNIFICANT CHANGE UP (ref 0–0)
PLATELET # BLD AUTO: 276 K/UL — SIGNIFICANT CHANGE UP (ref 150–400)
POTASSIUM SERPL-MCNC: 3.9 MMOL/L — SIGNIFICANT CHANGE UP (ref 3.5–5.3)
POTASSIUM SERPL-SCNC: 3.9 MMOL/L — SIGNIFICANT CHANGE UP (ref 3.5–5.3)
RBC # BLD: 4.68 M/UL — SIGNIFICANT CHANGE UP (ref 3.8–5.2)
RBC # FLD: 12.4 % — SIGNIFICANT CHANGE UP (ref 10.3–14.5)
SODIUM SERPL-SCNC: 140 MMOL/L — SIGNIFICANT CHANGE UP (ref 135–145)
TROPONIN T, HIGH SENSITIVITY RESULT: <6 NG/L — SIGNIFICANT CHANGE UP (ref 0–51)
WBC # BLD: 13.71 K/UL — HIGH (ref 3.8–10.5)
WBC # FLD AUTO: 13.71 K/UL — HIGH (ref 3.8–10.5)

## 2024-11-22 PROCEDURE — 74183 MRI ABD W/O CNTR FLWD CNTR: CPT | Mod: 26

## 2024-11-22 PROCEDURE — 93356 MYOCRD STRAIN IMG SPCKL TRCK: CPT

## 2024-11-22 PROCEDURE — 93306 TTE W/DOPPLER COMPLETE: CPT | Mod: 26

## 2024-11-22 RX ADMIN — PANTOPRAZOLE SODIUM 40 MILLIGRAM(S): 40 TABLET, DELAYED RELEASE ORAL at 12:17

## 2024-11-22 RX ADMIN — Medication 5000 UNIT(S): at 22:26

## 2024-11-22 RX ADMIN — Medication 2 MILLIGRAM(S): at 17:12

## 2024-11-22 RX ADMIN — Medication 2 MILLIGRAM(S): at 17:07

## 2024-11-22 RX ADMIN — Medication 81 MILLIGRAM(S): at 12:17

## 2024-11-22 RX ADMIN — Medication 5000 UNIT(S): at 13:02

## 2024-11-22 NOTE — DISCHARGE NOTE PROVIDER - NSDCMRMEDTOKEN_GEN_ALL_CORE_FT
acetaminophen 500 mg oral tablet: 2 tab(s) orally every 6 hours As needed Mild Pain (1 - 3)  amoxicillin-clavulanate 875 mg-125 mg oral tablet: 1 tab(s) orally every 12 hours  oxyCODONE 5 mg oral tablet: 1 tab(s) orally every 6 hours as needed for  severe pain MDD: 4 tablets

## 2024-11-22 NOTE — PROGRESS NOTE ADULT - ASSESSMENT
64 f with    Abdominal pain  - MRCP noted  - pain control  - GI evaluation DR Loya noted  - Surgical evaluation noted  - clears, advance to regular and observe    EKG changes  - cardiac enzymes noted   - Echo nosignificant findings  - Cardiology evaluation Dr Chanel noted    DVT prohylaxis    DCP home in am if stable.    d/w patient, daughter, ACP    Rad Ramos MD phone [phone]

## 2024-11-22 NOTE — DISCHARGE NOTE PROVIDER - NSDCCPCAREPLAN_GEN_ALL_CORE_FT
PRINCIPAL DISCHARGE DIAGNOSIS  Diagnosis: Epigastric abdominal pain  Assessment and Plan of Treatment: You presented with abdominal pain   CT A/P revealed dilated common bile duct   GI was consulted; you had an MRCP which revealed gallbladder adenomyosis and diverticulum  Follow up outpatient with GI and PCP within one week for further outpatient management     PRINCIPAL DISCHARGE DIAGNOSIS  Diagnosis: Acute cholecystitis  Assessment and Plan of Treatment: HOSPITAL COURSE: You had a laparoscopic fenestrated subtotal cholecystectomy on 11/27/24  ANTIBIOTICS: You were treated with IV antibiotics in the hospital. You will be dischrged with oral antibiotics (Augmentin) 2 times a day to finish on 12/01/24  Take your medicine with a glass of water or food as told by your doctor.  Take the medicine as told. Finish them even if you start to feel better.  Do not give your medicine to other people.  Do not use your medicine in the future for a different infection.  Ask your doctor about which side effects to watch for.  Try not to miss any doses. If you miss a dose, take it as soon as possible  FOLLOW UP: Please follow up with Dr. Siu in 2-4 weeks

## 2024-11-22 NOTE — DISCHARGE NOTE PROVIDER - NSDCFUADDAPPT_GEN_ALL_CORE_FT
APPTS ARE READY TO BE MADE: [X] YES    Best Family or Patient Contact (if needed):    Additional Information about above appointments (if needed):    1:   2:   3:     Other comments or requests:    APPTS ARE READY TO BE MADE: [X] YES    Best Family or Patient Contact (if needed):    Additional Information about above appointments (if needed):    1:   2:   3:     Other comments or requests:     Met with patient face to face and provided the patient with provider referral information, however patient prefers to schedule the appointments on their own.    Spoke with Patient and daughter. They will schedule based on daughters schedule. Gave IRP card if they need assistance.  Inpatient Referral Program [phone]

## 2024-11-22 NOTE — ED ADULT NURSE REASSESSMENT NOTE - NS ED NURSE REASSESS COMMENT FT1
0804 Barney Ceron called for off tele states when pt goes to flr w/o incidents/changes will place order for off tele asap/pending room

## 2024-11-22 NOTE — DISCHARGE NOTE PROVIDER - NSDCACTIVITY_GEN_ALL_CORE
No heavy lifting/straining Do not drive or operate machinery/Showering allowed/Do not make important decisions/Stairs allowed/Walking - Indoors allowed/No heavy lifting/straining/Walking - Outdoors allowed/Follow Instructions Provided by your Surgical Team

## 2024-11-22 NOTE — DISCHARGE NOTE PROVIDER - CARE PROVIDER_API CALL
Thad Loya  Gastroenterology  [address] [facility], [address]  [place] [address]  Phone: [phone]  Fax: [phone]  Follow Up Time:     Rad Ramos  Internal Medicine  [address] [place] [address]  Phone: [phone]  Fax: [phone]  Follow Up Time:    Jason Ward  Surgery  [address], [address]  [place] [address]  Phone: [phone]  Fax: [phone]  Follow Up Time: 1 week    Thad Loya  Gastroenterology  [address], [address]  [place] [address]  Phone: [phone]  Fax: [phone]  Follow Up Time: 2 weeks    Rad Ramos  Internal Medicine  [address] [place] [address]  Phone: [phone]  Fax: [phone]  Follow Up Time: 2 weeks

## 2024-11-22 NOTE — PROGRESS NOTE ADULT - ASSESSMENT
agree with mri  abl dilation likely from diverticulum  may need op egd  ppi and discharge planning if stable

## 2024-11-22 NOTE — CONSULT NOTE ADULT - TIME BILLING
A/P  65 yo F no PMH here with epigastric and abdominal pain radiating to the back since 0700 today. Pain is constant and worse w eating assoc w nausea and few episodes of emesis. Patient reports SOB from the abdominal pain, not being able to take a full breath in.      #Epigastric/Abdominal pain, N/V  -atypical sx with possible gi etiology  -no ACS  -echo with normal lv fxn  -ecg with poss old inf wall mi but unchanged from 2023 ekg  -no clinical HF  -can defer further inpt cv w/u  -continued gi/surgical w/u    dvt ppx

## 2024-11-22 NOTE — DISCHARGE NOTE PROVIDER - CARE PROVIDERS DIRECT ADDRESSES
[email or web address]

## 2024-11-22 NOTE — PROGRESS NOTE ADULT - SUBJECTIVE AND OBJECTIVE BOX
Patient is a 64y old  Female who presents with a chief complaint of dyspepsia (22 Nov 2024 07:00)      SUBJECTIVE / OVERNIGHT EVENTS: c/o RUQ and epigastric pain.  Review of Systems  chest pain no  palpitations no  sob no  nausea no  headache no    MEDICATIONS  (STANDING):  aspirin enteric coated 81 milliGRAM(s) Oral daily  heparin   Injectable 5000 Unit(s) SubCutaneous every 8 hours  pantoprazole    Tablet 40 milliGRAM(s) Oral before breakfast    MEDICATIONS  (PRN):  acetaminophen     Tablet .. 650 milliGRAM(s) Oral every 6 hours PRN Temp greater or equal to 38.5C (101.3F), Mild Pain (1 - 3)  morphine  - Injectable 2 milliGRAM(s) IV Push every 6 hours PRN Severe Pain (7 - 10)  oxycodone    5 mG/acetaminophen 325 mG 1 Tablet(s) Oral every 6 hours PRN Moderate Pain (4 - 6)      Vital Signs Last 24 Hrs  T(C): 37.1 (22 Nov 2024 18:00), Max: 37.4 (22 Nov 2024 17:30)  T(F): 98.8 (22 Nov 2024 18:00), Max: 99.3 (22 Nov 2024 17:30)  HR: 115 (22 Nov 2024 18:00) (75 - 115)  BP: 125/91 (22 Nov 2024 18:00) (125/91 - 165/74)  BP(mean): --  RR: 18 (22 Nov 2024 18:00) (18 - 20)  SpO2: 92% (22 Nov 2024 18:00) (92% - 100%)    Parameters below as of 22 Nov 2024 18:00  Patient On (Oxygen Delivery Method): room air        PHYSICAL EXAM:  GENERAL: NAD, well-developed  HEAD:  Atraumatic, Normocephalic  EYES: EOMI, PERRLA, conjunctiva and sclera clear  NECK: Supple, No JVD  CHEST/LUNG: Clear to auscultation bilaterally; No wheeze  HEART: Regular rate and rhythm; No murmurs, rubs, or gallops  ABDOMEN: Soft, Nontender, Nondistended; Bowel sounds present RUQ tender no rebound.  EXTREMITIES:  2+ Peripheral Pulses, No clubbing, cyanosis, or edema  PSYCH: AAOx3  NEUROLOGY: non-focal  SKIN: No rashes or lesions    LABS:                        14.6   13.71 )-----------( 276      ( 22 Nov 2024 12:26 )             42.2     11-22    140  |  103  |  23  ----------------------------<  110[H]  3.9   |  22  |  0.56    Ca    9.5      22 Nov 2024 12:26  Mg     2.4     11-21    TPro  8.4[H]  /  Alb  4.6  /  TBili  0.4  /  DBili  x   /  AST  23  /  ALT  28  /  AlkPhos  92  11-21      CARDIAC MARKERS ( 21 Nov 2024 19:32 )  x     / x     / x     / x     / 1.2 ng/mL      Urinalysis Basic - ( 22 Nov 2024 12:26 )    Color: x / Appearance: x / SG: x / pH: x  Gluc: 110 mg/dL / Ketone: x  / Bili: x / Urobili: x   Blood: x / Protein: x / Nitrite: x   Leuk Esterase: x / RBC: x / WBC x   Sq Epi: x / Non Sq Epi: x / Bacteria: x          RADIOLOGY & ADDITIONAL TESTS:    Imaging Personally Reviewed:    Consultant(s) Notes Reviewed:      Care Discussed with Consultants/Other Providers:

## 2024-11-22 NOTE — CONSULT NOTE ADULT - ASSESSMENT
A/P  65 yo F no PMH here with epigastric and abdominal pain radiating to the back since 0700 today. Pain is constant and worse w eating assoc w nausea and few episodes of emesis. Patient reports SOB from the abdominal pain, not being able to take a full breath in.      #Epigastric/Abdominal pain, N/V  -pt states a/w SOB from not being able to take a full breath in d/t the abdominal pain  -pt denies CP and states SOB has since resolved with improvement in abdominal pain  -HST x3 negative  -CK, CKMB normal   -EKG shows NSR HR 86 with no acute ischemic changes when compared to prior EKG August 2023  -TTE shows normal LVSF with EF 74%; no reg WMA  -CXR shows clear lungs  -POCUS Abdomen shows Dilated common bile duct without sonographic evidence of acute cholecystitis.  -CT A/P shows Mild extrahepatic biliary ductal dilatation with normal distal tapering at the ampulla, unchanged from 8/21/2023. Note is made of an adjacent periampullary duodenal diverticulum, possibly the cause of the mildly dilated CBD  -MR MRCP noted -Periampullary duodenal diverticulum  -GI/Surgery f/u   -management per GI    dvt ppx

## 2024-11-22 NOTE — DISCHARGE NOTE PROVIDER - HOSPITAL COURSE
HPI:  64F no PMH here with epigastric pain radiating to the back since 0700 today. Pain is constant and worse w eating assoc w nausea and few episodes of emesis. NO fevers. No hematemesis. No diarrhea, No fevers. Not on any meds, No EtOH. No prior abd surg. (21 Nov 2024 17:49)    Hospital Course:  Patient admitted for epigastric pain. CT A/P revealed CBD duct dilation. GI was consulted; MRCP revealed gallbladder adenomyomatosis and diverticulum. Patient cleared by GI with outpatient follow up   Hospital course complicated by EKG changes. Cardiology was consulted; TTE was wnl with no wall motion abnormalities    Important Medication Changes and Reason:  >Continue all home medications as prescribed    Active or Pending Issues Requiring Follow-up:  >PCP follow up within one week for further outpatient management   >GI follow up for further outpatient management     Advanced Directives:   [X] Full code  [ ] DNR  [ ] Hospice    Discharge Diagnoses:  >Abdominal pain     Discharge/dispo/med rec discussed with medical attending Dr. Ramos. Patient is medically cleared for discharge with outpatient follow up         64F with no PMHx presents with epigastric pain radiating to the back since 0700 today. Pain is constant and worse w eating assoc w nausea and few episodes of emesis. NO fevers. No hematemesis. No diarrhea, No fevers. Not on any meds, No EtOH. No prior abd surgery    Patient was initially admitted under Internal Medicine for further evaluation and management. CT A/P (11/21/24) revealed CBD duct dilation.   GI was consulted. MRCP (11/22/24) revealed gallbladder adenomyomatosis and diverticulum. Patient cleared by GI with outpatient follow up   Hospital course complicated by EKG changes. Cardiology was consulted; TTE (11/22/24) was wnl with no wall motion abnormalities. CTA chest (11/24/24) negative for PE.  Pt then developed worsening abdominal pain and persistent leukocytosis (peak WBC: ~13). CT abd/pelvis (11/23/24) significant for increased gallbladder wall edema and pericholecystic fluid, gallbladder distention, and liver hyperemia adjacent to the gallbladder fossa; findings are concerning for acute cholecystitis. HIDA (11/25/24) with un-visualized gallbladder despite morphine administration.    Pt was taken to the OR on 11/27/24, and is s/p laparoscopic fenestrated subtotal cholecystectomy. The patient tolerated the procedure well (see operative report for full details). Pt was transferred to the PACU in stable condition, and subsequently transferred to surgical service. In the PACU, the patient's pain was controlled and vital signs within normal limits. On POC, the patient was recovering appropriately. The patient was transferred to the surgical floor in stable condition. On POD #1, pt was stable and doing well. Once IV pain control dosing complete, pt was transitioned to oral Tylenol with Oxycodone for breakthrough pain. Diet was advanced as tolerated, and GI function returned.   Ambulation and incentive spirometry encouraged. Labs were monitored daily, and electrolytes were repleted as necessary.     Pt will be discharged with CHIRAG drain.    On the day of discharge, the patient's vital signs are within normal limits, pain is controlled, voiding urine, tolerating a PO diet, and ambulating well. Pt will f/u with Dr. Siu in 1-2 weeks. Pt will f/u with PCP in 1-2 weeks. 64F with no PMHx presents with epigastric pain radiating to the back since 0700 today. Pain is constant and worse w eating assoc w nausea and few episodes of emesis. NO fevers. No hematemesis. No diarrhea, No fevers. Not on any meds, No EtOH. No prior abd surgery    Patient was initially admitted under Internal Medicine for further evaluation and management. CT A/P (11/21/24) revealed CBD duct dilation.   GI was consulted. MRCP (11/22/24) revealed gallbladder adenomyomatosis and diverticulum. Patient cleared by GI with outpatient follow up   Hospital course complicated by EKG changes. Cardiology was consulted; TTE (11/22/24) was wnl with no wall motion abnormalities. CTA chest (11/24/24) negative for PE.  Pt then developed worsening abdominal pain and persistent leukocytosis (peak WBC: ~13). CT abd/pelvis (11/23/24) significant for increased gallbladder wall edema and pericholecystic fluid, gallbladder distention, and liver hyperemia adjacent to the gallbladder fossa; findings are concerning for acute cholecystitis. HIDA (11/25/24) with un-visualized gallbladder despite morphine administration.    Pt was taken to the OR on 11/27/24, and is s/p laparoscopic fenestrated subtotal cholecystectomy. The patient tolerated the procedure well (see operative report for full details). Pt was transferred to the PACU in stable condition, and subsequently transferred to surgical service. In the PACU, the patient's pain was controlled and vital signs within normal limits. On POC, the patient was recovering appropriately. The patient was transferred to the surgical floor in stable condition. On POD #1, pt was stable and doing well. Once IV pain control dosing complete, pt was transitioned to oral Tylenol with Oxycodone for breakthrough pain. Diet was advanced as tolerated, and GI function returned.   Ambulation and incentive spirometry encouraged. Labs were monitored daily, and electrolytes were repleted as necessary.     Pt will be discharged with CHIRAG drain and PO Augmentin (last day: 12/01/24)    On the day of discharge, the patient's vital signs are within normal limits, pain is controlled, voiding urine, tolerating a PO diet, and ambulating well. Pt will f/u with Dr. Siu in 1-2 weeks. Pt will f/u with PCP in 1-2 weeks.

## 2024-11-22 NOTE — CONSULT NOTE ADULT - SUBJECTIVE AND OBJECTIVE BOX
CARDIOLOGY CONSULT - Dr. Chanel         HPI:  63 yo F no PMH here with epigastric and abdominal pain radiating to the back since 0700 today. Pain is constant and worse w eating assoc w nausea and few episodes of emesis. Patient reports SOB from the abdominal pain, not being able to take a full breath in. NO fevers. No hematemesis. No diarrhea, No fevers. Not on any meds, No EtOH. No prior abd surg. Patient denies chest pain, leg swelling, or passing out.       PAST MEDICAL & SURGICAL HISTORY:  Fall, subsequent encounter      No significant past surgical history              PREVIOUS DIAGNOSTIC TESTING:    [ ] Echocardiogram:  [ ]  Catheterization:  [ ] Stress Test:  	    MEDICATIONS:  Home Medications:      MEDICATIONS  (STANDING):  aspirin enteric coated 81 milliGRAM(s) Oral daily  heparin   Injectable 5000 Unit(s) SubCutaneous every 8 hours  pantoprazole    Tablet 40 milliGRAM(s) Oral before breakfast      FAMILY HISTORY:      SOCIAL HISTORY:    [ ] Non-smoker  [x ] Smoker  [ ] Alcohol    Allergies    No Known Allergies    Intolerances    	    REVIEW OF SYSTEMS:  CONSTITUTIONAL: No fever, weight loss, or fatigue  EYES: No eye pain, visual disturbances, or discharge  ENMT:  No difficulty hearing, tinnitus, vertigo; No sinus or throat pain  NECK: No pain or stiffness  RESPIRATORY: No cough, wheezing, chills or hemoptysis; +Shortness of Breath  CARDIOVASCULAR: No chest pain, palpitations, passing out, dizziness, or leg swelling  GASTROINTESTINAL: +abdominal pain, +epigastric pain. +nausea, + vomiting, No diarrhea or constipation. No melena or hematochezia.  GENITOURINARY: No dysuria, frequency, hematuria, or incontinence  NEUROLOGICAL: No headaches, memory loss, loss of strength, numbness, or tremors  SKIN: No itching, burning, rashes, or lesions   	    [ x] All others negative	  [ ] Unable to obtain    PHYSICAL EXAM:  T(C): 37.1 (11-22-24 @ 12:06), Max: 37.1 (11-22-24 @ 01:55)  HR: 96 (11-22-24 @ 12:06) (75 - 96)  BP: 139/79 (11-22-24 @ 12:06) (131/69 - 165/74)  RR: 18 (11-22-24 @ 12:06) (16 - 20)  SpO2: 95% (11-22-24 @ 12:06) (95% - 100%)  Wt(kg): --  I&O's Summary      Appearance: Normal	  Psychiatry: A & O x 3, Mood & affect appropriate  HEENT:   Normal oral mucosa, PERRL, EOMI	  Lymphatic: No lymphadenopathy  Cardiovascular: Normal S1 S2,RRR, No JVD, No murmurs  Respiratory: Lungs clear to auscultation b/l  Gastrointestinal:  Soft, Non-tender, + BS	  Skin: No rashes, No ecchymoses, No cyanosis	  Neurologic: Non-focal  Extremities: Normal range of motion, No clubbing, cyanosis or edema  Vascular: Peripheral pulses palpable 2+ bilaterally    TELEMETRY: 	  NSR/ST  ECG:  	NSR HR 86 with no acute ischemic changes from prior EKG August 2023  RADIOLOGY:    < from: Xray Chest 2 Views PA/Lat (11.21.24 @ 11:56) >  IMPRESSION: Clear lungs.    < end of copied text >      < from: CT Abdomen and Pelvis w/ IV Cont (11.21.24 @ 15:27) >  IMPRESSION:  Mild extrahepatic biliary ductal dilatation with normal distal tapering   at the ampulla, unchanged from 8/21/2023. 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 Correlate with bilirubin levels and if clinically warranted,   more definitive characterization may be obtained with a contrast enhanced   MRI/MRCP.    < end of copied text >  OTHER: 	  	  < from: POCUS ED Abdomen Limited (11.21.24 @ 14:00) >    IMPRESSION: Dilated common bile duct without sonographic evidence of   acute cholecystitis.      < end of copied text >    < from: MR MRCP w/wo IV Cont (11.22.24 @ 09:35) >  IMPRESSION:  Adenomyomatosis of the gallbladder. Gallbladder sludge versus tiny   calculi.    Borderline caliber common duct which may be within the range of normal.    Periampullary duodenal diverticulum    < end of copied text >      < from: TTE W or WO Ultrasound Enhancing Agent (11.22.24 @ 06:10) >  CONCLUSIONS:      1. Left ventricular cavity is small. Left ventricular wall thickness is normal. Left ventricular systolic function is normal with an ejection fraction of 74 % by Sanabria's method of disks. There are no regional wall motion abnormalities seen.   2. Normal left ventricular diastolic function, with normal left ventricular filling pressure.   3. Normal right ventricular cavity size, with normal wall thickness, and normal right ventricular systolic function. Tricuspid annular plane systolic excursion (TAPSE) is 1.8 cm (normal >=1.7 cm). Tricuspid annular tissue Doppler S' is 24.0 cm/s (normal >10 cm/s).   4. Estimated pulmonary artery systolic pressure is 21 mmHg.   5. No pericardial effusion seen.   6. No prior echocardiogram is available for comparison.   7. There is normal LV mass and concentric remodeling.   8. Left ventricular global longitudinal strain is -24.7 % which is normal (< -18%). Images were acquired on a [employer] ultrasound system and processed using Giv[employer] strain analysis software with a heart rate of 93 bpm and a blood pressure of 131/69 mmHg.    < end of copied text >  LABS:	 	    CARDIAC MARKERS:  Troponin T, High Sensitivity Result: <6 ng/L (11-22 @ 01:50)  Troponin T, High Sensitivity Result: <6 ng/L (11-21 @ 19:32)  Troponin T, High Sensitivity Result: <6 ng/L (11-21 @ 11:47)                                  14.6   13.71 )-----------( 276      ( 22 Nov 2024 12:26 )             42.2     11-22    140  |  103  |  23  ----------------------------<  110[H]  3.9   |  22  |  0.56    Ca    9.5      22 Nov 2024 12:26  Mg     2.4     11-21    TPro  8.4[H]  /  Alb  4.6  /  TBili  0.4  /  DBili  x   /  AST  23  /  ALT  28  /  AlkPhos  92  11-21      proBNP:   Lipid Profile:   HgA1c:   TSH:       
                                                                                            General Surgery Consult  Consulting surgical team: [facility]   Consulting attending: Shannan   Patient seen and examined: 11-21-24 @ 22:15    HPI:  64F presents with epigastric pain radiating to the back since the morning. The patient states she has not had similar pain episodes in the past. She states she had an episode of emesis on the way to the hospital but has not vomited since. Surgery consulted given CT finding of mild extrahepatic biliary ductal dilation and abdominal pain.       PAST MEDICAL HISTORY:  Fall, subsequent encounter        PAST SURGICAL HISTORY:  No significant past surgical history        ALLERGIES:  No Known Allergies      MEDICATIONS:  acetaminophen     Tablet .. 650 milliGRAM(s) Oral every 6 hours PRN  aspirin enteric coated 81 milliGRAM(s) Oral daily  heparin   Injectable 5000 Unit(s) SubCutaneous every 8 hours  morphine  - Injectable 2 milliGRAM(s) IV Push every 6 hours PRN  oxycodone    5 mG/acetaminophen 325 mG 1 Tablet(s) Oral every 6 hours PRN  pantoprazole    Tablet 40 milliGRAM(s) Oral before breakfast      VITALS & I/Os:  Vital Signs Last 24 Hrs  T(C): 36.9 (21 Nov 2024 10:42), Max: 36.9 (21 Nov 2024 10:42)  T(F): 98.4 (21 Nov 2024 10:42), Max: 98.4 (21 Nov 2024 10:42)  HR: 91 (21 Nov 2024 16:45) (87 - 99)  BP: 138/83 (21 Nov 2024 16:45) (138/83 - 148/62)  BP(mean): --  RR: 20 (21 Nov 2024 16:45) (18 - 20)  SpO2: 97% (21 Nov 2024 16:45) (97% - 99%)    Parameters below as of 21 Nov 2024 16:45  Patient On (Oxygen Delivery Method): room air        I&O's Summary      PHYSICAL EXAM:  General: No acute distress  Respiratory: Nonlabored respirations   Cardiovascular: pulse present  Abdominal: Softly distended, mild discomfort in RUQ   Extremities: Appears warm and well perfused    LABS:                        14.3   9.68  )-----------( 276      ( 21 Nov 2024 11:47 )             41.7     11-21    139  |  103  |  20  ----------------------------<  139[H]  3.8   |  21[L]  |  0.63    Ca    9.7      21 Nov 2024 11:47  Mg     2.4     11-21    TPro  8.4[H]  /  Alb  4.6  /  TBili  0.4  /  DBili  x   /  AST  23  /  ALT  28  /  AlkPhos  92  11-21    Lactate:        CARDIAC MARKERS ( 21 Nov 2024 19:32 )  x     / x     / x     / x     / 1.2 ng/mL        Urinalysis Basic - ( 21 Nov 2024 11:47 )    Color: x / Appearance: x / SG: x / pH: x  Gluc: 139 mg/dL / Ketone: x  / Bili: x / Urobili: x   Blood: x / Protein: x / Nitrite: x   Leuk Esterase: x / RBC: x / WBC x   Sq Epi: x / Non Sq Epi: x / Bacteria: x        IMAGING:

## 2024-11-22 NOTE — DISCHARGE NOTE PROVIDER - PROVIDER TOKENS
PROVIDER:[TOKEN:[[id number]]],PROVIDER:[TOKEN:[[id number]]] PROVIDER:[TOKEN:[[id number]],FOLLOWUP:[1 week]],PROVIDER:[TOKEN:[[id number]],FOLLOWUP:[2 weeks]],PROVIDER:[TOKEN:[[id number]],FOLLOWUP:[2 weeks]]

## 2024-11-22 NOTE — DISCHARGE NOTE PROVIDER - NSDCCPTREATMENT_GEN_ALL_CORE_FT
PRINCIPAL PROCEDURE  Procedure: Cholecystectomy, partial, laparoscopic  Findings and Treatment: WOUND CARE: Leave the white steri strips in place; they will fall off on their own in approximately 5-7 days. Otherwise, your surgeon will remove them in the office. You will be discharged with CHIRAG drains. You will need to empty them and record outputs accurately. This will be taught to you by the nursing staff. Please do not remove the CHIRAG drains. They will be removed in the office. Please bring to the office accurate records of output.   BATHING: You may shower and/or sponge bathe. Remove outer dressing prior to shower. Let soap and water run over incision; do NOT scrub incision. Pat abdomen dry after, and replace with gauze with paper tape. Do no submerge the incision underwater (no baths, swimming pools, hot tubs) for the next 2 weeks.  ACTIVITY: No heavy lifting anything more than 10-15lbs or straining. Otherwise, you may return to your usual level of physical activity. If you are taking narcotic pain medication (such as Percocet), do NOT drive a car, operate machinery or make important decisions.  PAIN: A prescription for oxycodone has been sent to the pharmacy. You should only take these for severe pain. For mild or moderate pain, you may take 975mg of tylenol every 6 hours. Do not exceed more than 4G per day.   NOTIFY YOUR SURGEON IF: You have any bleeding that does not stop, any pus draining from your wound, any fever (over 100.4 F) or chills, persistent nausea/vomiting with inability to tolerate food or liquids, persistent diarrhea, or if severe abdominal pain is not controlled on your discharge pain medications.  FOLLOW-UP:  1. Please call to make a follow-up appointment within one to two weeks of discharge with Dr. Siu  2. Please follow up with your primary care physician in one week regarding your hospitalization.

## 2024-11-22 NOTE — PROGRESS NOTE ADULT - SUBJECTIVE AND OBJECTIVE BOX
Patient is a 64y Female     Patient is a 64y old  Female who presents with a chief complaint of abd pain (21 Nov 2024 20:09)      HPI:  64F no PMH here with epigastric pain radiating to the back since 0700 today. Pain is constant and worse w eating assoc w nausea and few episodes of emesis. NO fevers. No hematemesis. No diarrhea, No fevers. Not on any meds, No EtOH. No prior abd surg. (21 Nov 2024 17:49)      PAST MEDICAL & SURGICAL HISTORY:  Fall, subsequent encounter      No significant past surgical history          MEDICATIONS  (STANDING):  aspirin enteric coated 81 milliGRAM(s) Oral daily  heparin   Injectable 5000 Unit(s) SubCutaneous every 8 hours  pantoprazole    Tablet 40 milliGRAM(s) Oral before breakfast      Allergies    No Known Allergies    Intolerances        SOCIAL HISTORY:  Denies ETOh,Smoking,     FAMILY HISTORY:      REVIEW OF SYSTEMS:    CONSTITUTIONAL: No weakness, fevers or chills  EYES/ENT: No visual changes;  No vertigo or throat pain   NECK: No pain or stiffness  RESPIRATORY: No cough, wheezing, hemoptysis; No shortness of breath  CARDIOVASCULAR: No chest pain or palpitations  GASTROINTESTINAL: No abdominal or epigastric pain. No nausea, vomiting, or hematemesis; No diarrhea or constipation. No melena or hematochezia.  GENITOURINARY: No dysuria, frequency or hematuria  NEUROLOGICAL: No numbness or weakness  SKIN: No itching, burning, rashes, or lesions   All other review of systems is negative unless indicated above.    VITAL:  T(C): , Max: 37.1 (11-22-24 @ 01:55)  T(F): , Max: 98.8 (11-22-24 @ 01:55)  HR: 87 (11-22-24 @ 04:29)  BP: 131/69 (11-22-24 @ 04:29)  BP(mean): --  RR: 18 (11-22-24 @ 04:29)  SpO2: 96% (11-22-24 @ 04:29)  Wt(kg): --    I and O's:    Height (cm): 165.1 (11-21 @ 10:42)  Weight (kg): 63.5 (11-21 @ 10:42)  BMI (kg/m2): 23.3 (11-21 @ 10:42)  BSA (m2): 1.7 (11-21 @ 10:42)    PHYSICAL EXAM:    Constitutional: NAD  HEENT: PERRLA,   Neck: No JVD  Respiratory: CTA B/L  Cardiovascular: S1 and S2  Gastrointestinal: BS+, soft, NT/ND  Extremities: No peripheral edema  Neurological: A/O x 3, no focal deficits  Psychiatric: Normal mood, normal affect  : No Kidd  Skin: No rashes  Access: Not applicable  Back: No CVA tenderness    LABS:                        14.3   9.68  )-----------( 276      ( 21 Nov 2024 11:47 )             41.7     11-21    139  |  103  |  20  ----------------------------<  139[H]  3.8   |  21[L]  |  0.63    Ca    9.7      21 Nov 2024 11:47  Mg     2.4     11-21    TPro  8.4[H]  /  Alb  4.6  /  TBili  0.4  /  DBili  x   /  AST  23  /  ALT  28  /  AlkPhos  92  11-21          RADIOLOGY & ADDITIONAL STUDIES:

## 2024-11-23 LAB
ADD ON TEST-SPECIMEN IN LAB: SIGNIFICANT CHANGE UP
ALBUMIN SERPL ELPH-MCNC: 4.1 G/DL — SIGNIFICANT CHANGE UP (ref 3.3–5)
ALP SERPL-CCNC: 82 U/L — SIGNIFICANT CHANGE UP (ref 40–120)
ALT FLD-CCNC: 18 U/L — SIGNIFICANT CHANGE UP (ref 10–45)
ANION GAP SERPL CALC-SCNC: 12 MMOL/L — SIGNIFICANT CHANGE UP (ref 5–17)
AST SERPL-CCNC: 16 U/L — SIGNIFICANT CHANGE UP (ref 10–40)
BILIRUB DIRECT SERPL-MCNC: 0.2 MG/DL — SIGNIFICANT CHANGE UP (ref 0–0.3)
BILIRUB INDIRECT FLD-MCNC: 0.5 MG/DL — SIGNIFICANT CHANGE UP (ref 0.2–1)
BILIRUB SERPL-MCNC: 0.7 MG/DL — SIGNIFICANT CHANGE UP (ref 0.2–1.2)
BUN SERPL-MCNC: 23 MG/DL — SIGNIFICANT CHANGE UP (ref 7–23)
CALCIUM SERPL-MCNC: 9.2 MG/DL — SIGNIFICANT CHANGE UP (ref 8.4–10.5)
CHLORIDE SERPL-SCNC: 101 MMOL/L — SIGNIFICANT CHANGE UP (ref 96–108)
CO2 SERPL-SCNC: 21 MMOL/L — LOW (ref 22–31)
CREAT SERPL-MCNC: 0.66 MG/DL — SIGNIFICANT CHANGE UP (ref 0.5–1.3)
EGFR: 97 ML/MIN/1.73M2 — SIGNIFICANT CHANGE UP
GLUCOSE SERPL-MCNC: 112 MG/DL — HIGH (ref 70–99)
HCT VFR BLD CALC: 40.3 % — SIGNIFICANT CHANGE UP (ref 34.5–45)
HGB BLD-MCNC: 13.5 G/DL — SIGNIFICANT CHANGE UP (ref 11.5–15.5)
MCHC RBC-ENTMCNC: 30.1 PG — SIGNIFICANT CHANGE UP (ref 27–34)
MCHC RBC-ENTMCNC: 33.5 G/DL — SIGNIFICANT CHANGE UP (ref 32–36)
MCV RBC AUTO: 89.8 FL — SIGNIFICANT CHANGE UP (ref 80–100)
NRBC # BLD: 0 /100 WBCS — SIGNIFICANT CHANGE UP (ref 0–0)
PLATELET # BLD AUTO: 265 K/UL — SIGNIFICANT CHANGE UP (ref 150–400)
POTASSIUM SERPL-MCNC: 3.5 MMOL/L — SIGNIFICANT CHANGE UP (ref 3.5–5.3)
POTASSIUM SERPL-SCNC: 3.5 MMOL/L — SIGNIFICANT CHANGE UP (ref 3.5–5.3)
PROCALCITONIN SERPL-MCNC: 0.13 NG/ML — HIGH (ref 0.02–0.1)
PROT SERPL-MCNC: 7.5 G/DL — SIGNIFICANT CHANGE UP (ref 6–8.3)
RBC # BLD: 4.49 M/UL — SIGNIFICANT CHANGE UP (ref 3.8–5.2)
RBC # FLD: 12.4 % — SIGNIFICANT CHANGE UP (ref 10.3–14.5)
SODIUM SERPL-SCNC: 134 MMOL/L — LOW (ref 135–145)
WBC # BLD: 12.09 K/UL — HIGH (ref 3.8–10.5)
WBC # FLD AUTO: 12.09 K/UL — HIGH (ref 3.8–10.5)

## 2024-11-23 PROCEDURE — 74177 CT ABD & PELVIS W/CONTRAST: CPT | Mod: 26

## 2024-11-23 RX ADMIN — Medication 5000 UNIT(S): at 05:12

## 2024-11-23 RX ADMIN — Medication 5000 UNIT(S): at 13:54

## 2024-11-23 RX ADMIN — ACETAMINOPHEN 500MG 650 MILLIGRAM(S): 500 TABLET, COATED ORAL at 22:48

## 2024-11-23 RX ADMIN — Medication 5000 UNIT(S): at 22:49

## 2024-11-23 RX ADMIN — PANTOPRAZOLE SODIUM 40 MILLIGRAM(S): 40 TABLET, DELAYED RELEASE ORAL at 05:12

## 2024-11-23 RX ADMIN — Medication 81 MILLIGRAM(S): at 11:12

## 2024-11-23 NOTE — PROGRESS NOTE ADULT - ASSESSMENT
A/P  63 yo F no PMH here with epigastric and abdominal pain radiating to the back since 0700 today. Pain is constant and worse w eating assoc w nausea and few episodes of emesis. Patient reports SOB from the abdominal pain, not being able to take a full breath in.      #Epigastric/Abdominal pain, N/V  -pleuritic pain, referred from abdomen  -HST x3 negative, CK, CKMB normal   -EKG shows NSR HR 86 with no acute ischemic changes when compared to prior EKG August 2023  -TTE normal lv fxn, EF 74%  -CXR shows clear lungs  -CT A/P shows Mild extrahepatic biliary ductal dilatation with normal distal tapering at the ampulla, unchanged from 8/21/2023. Note is made of an adjacent periampullary duodenal diverticulum, possibly the cause of the mildly dilated CBD  -MR MRCP noted   -GI/Surgery f/u   -hydrate, adv diet per gi  -d/w fam at bedside  -d/w acp       dvt ppx      55 minutes spent on total encounter; more than 50% of the visit was spent counseling and/or coordinating care by the attending physician.

## 2024-11-23 NOTE — PROGRESS NOTE ADULT - SUBJECTIVE AND OBJECTIVE BOX
Patient is a 64y old  Female who presents with a chief complaint of dyspepsia (22 Nov 2024 07:00)      SUBJECTIVE / OVERNIGHT EVENTS: still with c/o RUQ pain.  Review of Systems  chest pain no  palpitations no  sob no  nausea no  headache no    MEDICATIONS  (STANDING):  aspirin enteric coated 81 milliGRAM(s) Oral daily  heparin   Injectable 5000 Unit(s) SubCutaneous every 8 hours  pantoprazole    Tablet 40 milliGRAM(s) Oral before breakfast    MEDICATIONS  (PRN):  acetaminophen     Tablet .. 650 milliGRAM(s) Oral every 6 hours PRN Temp greater or equal to 38.5C (101.3F), Mild Pain (1 - 3)  morphine  - Injectable 2 milliGRAM(s) IV Push every 6 hours PRN Severe Pain (7 - 10)  oxycodone    5 mG/acetaminophen 325 mG 1 Tablet(s) Oral every 6 hours PRN Moderate Pain (4 - 6)      Vital Signs Last 24 Hrs  T(C): 37.2 (23 Nov 2024 10:44), Max: 37.4 (23 Nov 2024 03:48)  T(F): 98.9 (23 Nov 2024 10:44), Max: 99.3 (23 Nov 2024 03:48)  HR: 113 (23 Nov 2024 10:44) (113 - 123)  BP: 117/71 (23 Nov 2024 10:44) (113/74 - 134/85)  BP(mean): --  RR: 18 (23 Nov 2024 10:44) (18 - 18)  SpO2: 93% (23 Nov 2024 10:44) (92% - 94%)    Parameters below as of 23 Nov 2024 10:44  Patient On (Oxygen Delivery Method): room air        PHYSICAL EXAM:  GENERAL: NAD, well-developed  HEAD:  Atraumatic, Normocephalic  EYES: EOMI, PERRLA, conjunctiva and sclera clear  NECK: Supple, No JVD  CHEST/LUNG: Clear to auscultation bilaterally; No wheeze  HEART: Regular rate and rhythm; No murmurs, rubs, or gallops  ABDOMEN: Soft, Nontender, Nondistended; Bowel sounds present  EXTREMITIES:  2+ Peripheral Pulses, No clubbing, cyanosis, or edema  PSYCH: AAOx3  NEUROLOGY: non-focal  SKIN: No rashes or lesions    LABS:                        13.5   12.09 )-----------( 265      ( 23 Nov 2024 07:36 )             40.3     11-23    134[L]  |  101  |  23  ----------------------------<  112[H]  3.5   |  21[L]  |  0.66    Ca    9.2      23 Nov 2024 07:36    TPro  7.5  /  Alb  4.1  /  TBili  0.7  /  DBili  0.2  /  AST  16  /  ALT  18  /  AlkPhos  82  11-23          Urinalysis Basic - ( 23 Nov 2024 07:36 )    Color: x / Appearance: x / SG: x / pH: x  Gluc: 112 mg/dL / Ketone: x  / Bili: x / Urobili: x   Blood: x / Protein: x / Nitrite: x   Leuk Esterase: x / RBC: x / WBC x   Sq Epi: x / Non Sq Epi: x / Bacteria: x          RADIOLOGY & ADDITIONAL TESTS:    Imaging Personally Reviewed:    Consultant(s) Notes Reviewed:      Care Discussed with Consultants/Other Providers:

## 2024-11-23 NOTE — PROGRESS NOTE ADULT - SUBJECTIVE AND OBJECTIVE BOX
FELICIANO [id number]  64yFemale  T(C): 37.2 (11-23-24 @ 10:44), Max: 37.4 (11-23-24 @ 03:48)  HR: 113 (11-23-24 @ 10:44) (113 - 123)  BP: 117/71 (11-23-24 @ 10:44) (113/74 - 134/85)  RR: 18 (11-23-24 @ 10:44) (18 - 18)  SpO2: 93% (11-23-24 @ 10:44) (92% - 94%)  Wt(kg): --  11-23 @ 07:01  -  11-23 @ 20:23  --------------------------------------------------------  IN: 540 mL / OUT: 0 mL / NET: 540 mL      normal cephalic atraumatic  s1s2   clear to ascultation bilaterally  soft, non tender, non distended no guarding or rebound  no clubbing cyanosis or edema    seen early am spoke liz recommend op endosopcy  ppi thearpy for now

## 2024-11-23 NOTE — PROGRESS NOTE ADULT - SUBJECTIVE AND OBJECTIVE BOX
CARDIOLOGY FOLLOW UP NOTE - DR. KING    Patient Name: FELICIANO MEYER    Date of Service: 11-23-24 @ 10:13    Patient seen and examined  c/o ruq pain, pleuritic    Subjective:    cv: denies chest pain, dyspnea, palpitations, dizziness  pulmonary: denies cough  GI: + abdominal pain, nausea, vomiting  vascular/legs: no edema   skin: no rash  ROS: otherwise negative   overnight events:      PHYSICAL EXAM:  T(C): 37.4 (11-23-24 @ 03:48), Max: 37.4 (11-22-24 @ 17:30)  HR: 115 (11-23-24 @ 03:48) (96 - 123)  BP: 113/74 (11-23-24 @ 03:48) (113/74 - 139/79)  RR: 18 (11-23-24 @ 03:48) (18 - 18)  SpO2: 94% (11-23-24 @ 03:48) (92% - 95%)  Wt(kg): --  I&O's Summary    Daily     Daily     Appearance: Normal	  Cardiovascular: Normal S1 S2,RRR, No JVD, No murmurs  Respiratory: Lungs clear to auscultation	  Gastrointestinal:  Soft, Non-tender, + BS	  Extremities: Normal range of motion, No clubbing, cyanosis or edema      Home Medications:      MEDICATIONS  (STANDING):  aspirin enteric coated 81 milliGRAM(s) Oral daily  heparin   Injectable 5000 Unit(s) SubCutaneous every 8 hours  pantoprazole    Tablet 40 milliGRAM(s) Oral before breakfast      TELEMETRY: 	    ECG:  	  RADIOLOGY:   DIAGNOSTIC TESTING:  [ ] Echocardiogram:  [ ] Catheterization:  [ ] Stress Test:    OTHER: 	    LABS:	 	    CARDIAC MARKERS:        Troponin T, High Sensitivity Result: <6 ng/L (11-22 @ 01:50)  Troponin T, High Sensitivity Result: <6 ng/L (11-21 @ 19:32)  Troponin T, High Sensitivity Result: <6 ng/L (11-21 @ 11:47)                                13.5   12.09 )-----------( 265      ( 23 Nov 2024 07:36 )             40.3     11-23    134[L]  |  101  |  23  ----------------------------<  112[H]  3.5   |  21[L]  |  0.66    Ca    9.2      23 Nov 2024 07:36  Mg     2.4     11-21    TPro  8.4[H]  /  Alb  4.6  /  TBili  0.4  /  DBili  x   /  AST  23  /  ALT  28  /  AlkPhos  92  11-21    proBNP:     Lipid Profile:   HgA1c:     Creatinine: 0.66 mg/dL (11-23-24 @ 07:36)  Creatinine: 0.56 mg/dL (11-22-24 @ 12:26)  Creatinine: 0.63 mg/dL (11-21-24 @ 11:47)

## 2024-11-23 NOTE — PROGRESS NOTE ADULT - ASSESSMENT
64 f with    Abdominal pain  - MRCP noted  - pain control  - GI evaluation DR Loya noted  - Surgical evaluation noted  - clears, advance to regular and observe  - CTa r/o PE     EKG changes  - cardiac enzymes noted   - Echo no significant findings  - Cardiology evaluation Dr Chanel noted    DVT prohylaxis    DCP  home if stable.    d/w patient, daughter, ACP    Rad Ramos MD phone [phone]

## 2024-11-24 LAB
ANION GAP SERPL CALC-SCNC: 12 MMOL/L — SIGNIFICANT CHANGE UP (ref 5–17)
ANION GAP SERPL CALC-SCNC: 16 MMOL/L — SIGNIFICANT CHANGE UP (ref 5–17)
BLD GP AB SCN SERPL QL: NEGATIVE — SIGNIFICANT CHANGE UP
BUN SERPL-MCNC: 12 MG/DL — SIGNIFICANT CHANGE UP (ref 7–23)
BUN SERPL-MCNC: 16 MG/DL — SIGNIFICANT CHANGE UP (ref 7–23)
CALCIUM SERPL-MCNC: 9 MG/DL — SIGNIFICANT CHANGE UP (ref 8.4–10.5)
CALCIUM SERPL-MCNC: 9.1 MG/DL — SIGNIFICANT CHANGE UP (ref 8.4–10.5)
CHLORIDE SERPL-SCNC: 103 MMOL/L — SIGNIFICANT CHANGE UP (ref 96–108)
CHLORIDE SERPL-SCNC: 98 MMOL/L — SIGNIFICANT CHANGE UP (ref 96–108)
CO2 SERPL-SCNC: 19 MMOL/L — LOW (ref 22–31)
CO2 SERPL-SCNC: 20 MMOL/L — LOW (ref 22–31)
CREAT SERPL-MCNC: 0.56 MG/DL — SIGNIFICANT CHANGE UP (ref 0.5–1.3)
CREAT SERPL-MCNC: 0.66 MG/DL — SIGNIFICANT CHANGE UP (ref 0.5–1.3)
EGFR: 101 ML/MIN/1.73M2 — SIGNIFICANT CHANGE UP
EGFR: 97 ML/MIN/1.73M2 — SIGNIFICANT CHANGE UP
GLUCOSE BLDC GLUCOMTR-MCNC: 112 MG/DL — HIGH (ref 70–99)
GLUCOSE SERPL-MCNC: 110 MG/DL — HIGH (ref 70–99)
GLUCOSE SERPL-MCNC: 92 MG/DL — SIGNIFICANT CHANGE UP (ref 70–99)
HCT VFR BLD CALC: 39.7 % — SIGNIFICANT CHANGE UP (ref 34.5–45)
HGB BLD-MCNC: 13.5 G/DL — SIGNIFICANT CHANGE UP (ref 11.5–15.5)
MCHC RBC-ENTMCNC: 30.8 PG — SIGNIFICANT CHANGE UP (ref 27–34)
MCHC RBC-ENTMCNC: 34 G/DL — SIGNIFICANT CHANGE UP (ref 32–36)
MCV RBC AUTO: 90.6 FL — SIGNIFICANT CHANGE UP (ref 80–100)
NRBC # BLD: 0 /100 WBCS — SIGNIFICANT CHANGE UP (ref 0–0)
PLATELET # BLD AUTO: 242 K/UL — SIGNIFICANT CHANGE UP (ref 150–400)
POTASSIUM SERPL-MCNC: 3.4 MMOL/L — LOW (ref 3.5–5.3)
POTASSIUM SERPL-MCNC: 4.1 MMOL/L — SIGNIFICANT CHANGE UP (ref 3.5–5.3)
POTASSIUM SERPL-SCNC: 3.4 MMOL/L — LOW (ref 3.5–5.3)
POTASSIUM SERPL-SCNC: 4.1 MMOL/L — SIGNIFICANT CHANGE UP (ref 3.5–5.3)
RBC # BLD: 4.38 M/UL — SIGNIFICANT CHANGE UP (ref 3.8–5.2)
RBC # FLD: 12.4 % — SIGNIFICANT CHANGE UP (ref 10.3–14.5)
RH IG SCN BLD-IMP: POSITIVE — SIGNIFICANT CHANGE UP
SODIUM SERPL-SCNC: 134 MMOL/L — LOW (ref 135–145)
SODIUM SERPL-SCNC: 134 MMOL/L — LOW (ref 135–145)
WBC # BLD: 11.98 K/UL — HIGH (ref 3.8–10.5)
WBC # FLD AUTO: 11.98 K/UL — HIGH (ref 3.8–10.5)

## 2024-11-24 PROCEDURE — 71275 CT ANGIOGRAPHY CHEST: CPT | Mod: 26

## 2024-11-24 RX ORDER — PIPERACILLIN SODIUM AND TAZOBACTAM SODIUM 4; .5 G/20ML; G/20ML
3.38 INJECTION, POWDER, LYOPHILIZED, FOR SOLUTION INTRAVENOUS EVERY 8 HOURS
Refills: 0 | Status: DISCONTINUED | OUTPATIENT
Start: 2024-11-24 | End: 2024-11-27

## 2024-11-24 RX ORDER — SODIUM CHLORIDE 9 MG/ML
500 INJECTION, SOLUTION INTRAMUSCULAR; INTRAVENOUS; SUBCUTANEOUS
Refills: 0 | Status: DISCONTINUED | OUTPATIENT
Start: 2024-11-24 | End: 2024-11-25

## 2024-11-24 RX ORDER — PIPERACILLIN SODIUM AND TAZOBACTAM SODIUM 4; .5 G/20ML; G/20ML
3.38 INJECTION, POWDER, LYOPHILIZED, FOR SOLUTION INTRAVENOUS ONCE
Refills: 0 | Status: COMPLETED | OUTPATIENT
Start: 2024-11-24 | End: 2024-11-24

## 2024-11-24 RX ORDER — INFLUENZA VIRUS VACCINE 15; 15; 15; 15 UG/.5ML; UG/.5ML; UG/.5ML; UG/.5ML
0.5 SUSPENSION INTRAMUSCULAR ONCE
Refills: 0 | Status: DISCONTINUED | OUTPATIENT
Start: 2024-11-24 | End: 2024-11-29

## 2024-11-24 RX ORDER — POTASSIUM CHLORIDE 600 MG/1
40 TABLET, EXTENDED RELEASE ORAL EVERY 4 HOURS
Refills: 0 | Status: COMPLETED | OUTPATIENT
Start: 2024-11-24 | End: 2024-11-24

## 2024-11-24 RX ADMIN — Medication 81 MILLIGRAM(S): at 11:30

## 2024-11-24 RX ADMIN — POTASSIUM CHLORIDE 40 MILLIEQUIVALENT(S): 600 TABLET, EXTENDED RELEASE ORAL at 11:29

## 2024-11-24 RX ADMIN — ACETAMINOPHEN 500MG 650 MILLIGRAM(S): 500 TABLET, COATED ORAL at 00:30

## 2024-11-24 RX ADMIN — Medication 5000 UNIT(S): at 21:20

## 2024-11-24 RX ADMIN — SODIUM CHLORIDE 100 MILLILITER(S): 9 INJECTION, SOLUTION INTRAMUSCULAR; INTRAVENOUS; SUBCUTANEOUS at 08:40

## 2024-11-24 RX ADMIN — PIPERACILLIN SODIUM AND TAZOBACTAM SODIUM 25 GRAM(S): 4; .5 INJECTION, POWDER, LYOPHILIZED, FOR SOLUTION INTRAVENOUS at 21:16

## 2024-11-24 RX ADMIN — ACETAMINOPHEN 500MG 650 MILLIGRAM(S): 500 TABLET, COATED ORAL at 21:24

## 2024-11-24 RX ADMIN — POTASSIUM CHLORIDE 40 MILLIEQUIVALENT(S): 600 TABLET, EXTENDED RELEASE ORAL at 13:24

## 2024-11-24 RX ADMIN — ACETAMINOPHEN 500MG 650 MILLIGRAM(S): 500 TABLET, COATED ORAL at 22:08

## 2024-11-24 RX ADMIN — PANTOPRAZOLE SODIUM 40 MILLIGRAM(S): 40 TABLET, DELAYED RELEASE ORAL at 05:52

## 2024-11-24 RX ADMIN — Medication 5000 UNIT(S): at 05:54

## 2024-11-24 RX ADMIN — PIPERACILLIN SODIUM AND TAZOBACTAM SODIUM 200 GRAM(S): 4; .5 INJECTION, POWDER, LYOPHILIZED, FOR SOLUTION INTRAVENOUS at 11:29

## 2024-11-24 NOTE — PROGRESS NOTE ADULT - SUBJECTIVE AND OBJECTIVE BOX
Surgery Progress Note     Subjective:  Patient seen and examined. Surgery reconsulted given CT AP showing findings c/f perforated acute cholecystitis. Patient denies nausea, vomiting, chest pain, SOB. Patient endorses some RUQ pain with breathing, but states her pain has improved since her admission.      Vital Signs:  Vital Signs Last 24 Hrs  T(C): 36.9 (24 Nov 2024 10:30), Max: 37.6 (23 Nov 2024 20:47)  T(F): 98.4 (24 Nov 2024 10:30), Max: 99.7 (23 Nov 2024 20:47)  HR: 118 (24 Nov 2024 10:30) (107 - 122)  BP: 120/75 (24 Nov 2024 10:30) (113/75 - 120/75)  RR: 18 (24 Nov 2024 10:30) (18 - 18)  SpO2: 93% (24 Nov 2024 10:30) (93% - 93%)    Parameters below as of 24 Nov 2024 10:30  Patient On (Oxygen Delivery Method): room air        CAPILLARY BLOOD GLUCOSE      POCT Blood Glucose.: 112 mg/dL (24 Nov 2024 07:53)      I&O's Detail    23 Nov 2024 07:01  -  24 Nov 2024 07:00  --------------------------------------------------------  IN:    Oral Fluid: 690 mL  Total IN: 690 mL    OUT:  Total OUT: 0 mL    Total NET: 690 mL      24 Nov 2024 07:01  -  24 Nov 2024 16:19  --------------------------------------------------------  IN:    Oral Fluid: 720 mL  Total IN: 720 mL    OUT:  Total OUT: 0 mL    Total NET: 720 mL            Physical Exam:  General: NAD, resting comfortably in bed  Respiratory: Nonlabored respirations  Cardio: pulse present  Abdomen: softly distended, mild TTP in RUQ  Vascular: extremities are warm and well perfused.       Labs:    11-24    134[L]  |  98  |  16  ----------------------------<  92  3.4[L]   |  20[L]  |  0.56    Ca    9.1      24 Nov 2024 07:31    TPro  7.5  /  Alb  4.1  /  TBili  0.7  /  DBili  0.2  /  AST  16  /  ALT  18  /  AlkPhos  82  11-23    LIVER FUNCTIONS - ( 23 Nov 2024 07:36 )  Alb: 4.1 g/dL / Pro: 7.5 g/dL / ALK PHOS: 82 U/L / ALT: 18 U/L / AST: 16 U/L / GGT: x                                 13.5   11.98 )-----------( 242      ( 24 Nov 2024 07:31 )             39.7

## 2024-11-24 NOTE — CONSULT NOTE ADULT - ASSESSMENT
Interventional Radiology    Evaluate for Procedure: possible cholecystostomy    HPI: 65 yo female with who presented with epigastric pain was found to have a small focal/contained perforation at the gallbladder neck. IR consulted for evaluation.     Allergies: No Known Allergies    Medications (Abx/Cardiac/Anticoagulation/Blood Products)  aspirin enteric coated: 81 milliGRAM(s) Oral (11-24 @ 11:30)  heparin   Injectable: 5000 Unit(s) SubCutaneous (11-24 @ 05:54)  piperacillin/tazobactam IVPB.: 200 mL/Hr IV Intermittent (11-24 @ 11:29)    Data:    T(C): 36.9  HR: 118  BP: 120/75  RR: 18  SpO2: 93%    -WBC 11.98 / HgB 13.5 / Hct 39.7 / Plt 242  -Na 134 / Cl 98 / BUN 16 / Glucose 92  -K 3.4 / CO2 20 / Cr 0.56  -ALT -- / Alk Phos -- / T.Bili --  -INR 0.94 / PTT 30.4      Radiology: CT, US & MRCP reviewe    Assessment/Plan:   65 yo female with who presented with epigastric pain was found to have a small focal/contained perforation at the gallbladder neck. IR consulted for evaluation.   -- chart and all imaging reviewed  -- new pericholecystic collections of unclear etiology (given normal appearance of gallbladder on 11/21)  -- patient will likely need some sort of decompression (cholecystostomy vs. biliary drain); recommend holding all anticoagulation (including aspirin)  -- please obtain coagulation profile  -- please obtain HIDA scan to help delineate anatomy to help guide intervention  -- NPO at midnight - no definitive procedure planned for tomorrow until proper pre-op & further imaging obtained, but in case patient clinically decompensates and requires more urgent intervention     d/w Dr. Pham    --  Po Sauer DO/FILIBERTO/SANAM, PGY-6 Chief Resident  Vascular and Interventional Radiology   Available on Microsoft Teams    - Non-emergent consults: Place IR consult order in [place]  - Emergent issues (pager): [facility] [phone]; [facility] [phone]; [phone]  - Scheduling questions: [facility] [phone]; [facility] [phone]  - Clinic/outpatient booking: [facility] [phone]; [facility] [phone] Interventional Radiology    Evaluate for Procedure: possible cholecystostomy    HPI: 63 yo female with who presented with epigastric pain was found to have a small focal/contained perforation at the gallbladder neck. IR consulted for evaluation.     Allergies: No Known Allergies    Medications (Abx/Cardiac/Anticoagulation/Blood Products)  aspirin enteric coated: 81 milliGRAM(s) Oral (11-24 @ 11:30)  heparin   Injectable: 5000 Unit(s) SubCutaneous (11-24 @ 05:54)  piperacillin/tazobactam IVPB.: 200 mL/Hr IV Intermittent (11-24 @ 11:29)    Data:    T(C): 36.9  HR: 118  BP: 120/75  RR: 18  SpO2: 93%    -WBC 11.98 / HgB 13.5 / Hct 39.7 / Plt 242  -Na 134 / Cl 98 / BUN 16 / Glucose 92  -K 3.4 / CO2 20 / Cr 0.56  -ALT -- / Alk Phos -- / T.Bili --  -INR 0.94 / PTT 30.4      Radiology: CT, US & MRCP reviewe    Assessment/Plan:   63 yo female with who presented with epigastric pain was found to have a small focal/contained perforation at the gallbladder neck. IR consulted for evaluation.   -- chart and all imaging reviewed  -- new pericholecystic collections of unclear etiology (given normal appearance of gallbladder on 11/21)  -- patient likely requires decompression (cholecystostomy vs. biliary drain); recommend holding all anticoagulation (including aspirin)  -- please obtain coags  -- please obtain HIDA scan to help delineate source of collections and patency of bile ducts, to help guide intervention  -- NPO at midnight - no definitive procedure planned for tomorrow until proper pre-op & further imaging obtained, but in case patient clinically decompensates and requires more urgent intervention     d/w Dr. Pham    --  Po Sauer DO/FILIBERTO/SANAM, PGY-6 Chief Resident  Vascular and Interventional Radiology   Available on [employer] Teams    - Non-emergent consults: Place IR consult order in [place]  - Emergent issues (pager): [facility] [phone]; [facility] [phone]; [phone]  - Scheduling questions: [facility] [phone]; [facility] [phone]  - Clinic/outpatient booking: [facility] [phone]; [facility] [phone]

## 2024-11-24 NOTE — PATIENT PROFILE ADULT - NS PRO AD NO ADVANCE DIRECTIVE
"Erma De León R.N. Lactation Consultant Signed   Progress Notes Date of Service: 6/18/2018 10:25 AM         []Hide copied text  []Hover for attribution information  Probable discharge today. Baby is term, Isma + and is on a feeding supplementation plan. Baby will be 48 hrs old tonight @1817. Baby has been latching because he is less sleepy than earlier. He has not been under bili lights and labs are pending for possible discharge. Baby was latched but when Mom states that it feels \"pinchy\". Detached baby and her nipple is ridged. Helped her work on latching baby when he has a very wide mouth, leading in with the chin. Worked on compressing the breast for a deeper latch. Baby appears to have a strong active suck. Also showed mother how to do hand expression to bring milk to the nipple and as a lactation tool.      Mom has Medicaid but is covered by [facility] through her father. Informed her of the lactation services available to her through that insurance and the ability to obtain a pump. Discussed OP lactation resources available to her. She has a written plan for feeding and supplementing and will follow up with Peds for weight and bili levels.                " No

## 2024-11-24 NOTE — PROGRESS NOTE ADULT - SUBJECTIVE AND OBJECTIVE BOX
Patient is a 64y Female     Patient is a 64y old  Female who presents with a chief complaint of dyspepsia (23 Nov 2024 20:23)      HPI:  64F no PMH here with epigastric pain radiating to the back since 0700 today. Pain is constant and worse w eating assoc w nausea and few episodes of emesis. NO fevers. No hematemesis. No diarrhea, No fevers. Not on any meds, No EtOH. No prior abd surg. (21 Nov 2024 17:49)      PAST MEDICAL & SURGICAL HISTORY:  Fall, subsequent encounter      No significant past surgical history          MEDICATIONS  (STANDING):  aspirin enteric coated 81 milliGRAM(s) Oral daily  heparin   Injectable 5000 Unit(s) SubCutaneous every 8 hours  pantoprazole    Tablet 40 milliGRAM(s) Oral before breakfast  piperacillin/tazobactam IVPB. 3.375 Gram(s) IV Intermittent once  piperacillin/tazobactam IVPB.- 3.375 Gram(s) IV Intermittent once  piperacillin/tazobactam IVPB.. 3.375 Gram(s) IV Intermittent every 8 hours  potassium chloride   Solution 40 milliEquivalent(s) Oral every 4 hours  sodium chloride 0.9%. 500 milliLiter(s) (100 mL/Hr) IV Continuous <Continuous>      Allergies    No Known Allergies    Intolerances        SOCIAL HISTORY:  Denies ETOh,Smoking,     FAMILY HISTORY:      REVIEW OF SYSTEMS:    CONSTITUTIONAL: No weakness, fevers or chills  EYES/ENT: No visual changes;  No vertigo or throat pain   NECK: No pain or stiffness  RESPIRATORY: No cough, wheezing, hemoptysis; No shortness of breath  CARDIOVASCULAR: No chest pain or palpitations  GASTROINTESTINAL: No abdominal or epigastric pain. No nausea, vomiting, or hematemesis; No diarrhea or constipation. No melena or hematochezia.  GENITOURINARY: No dysuria, frequency or hematuria  NEUROLOGICAL: No numbness or weakness  SKIN: No itching, burning, rashes, or lesions   All other review of systems is negative unless indicated above.    VITAL:  T(C): , Max: 37.6 (11-23-24 @ 20:47)  T(F): , Max: 99.7 (11-23-24 @ 20:47)  HR: 118 (11-24-24 @ 10:30)  BP: 120/75 (11-24-24 @ 10:30)  BP(mean): --  RR: 18 (11-24-24 @ 10:30)  SpO2: 93% (11-24-24 @ 10:30)  Wt(kg): --    I and O's:    11-23 @ 07:01  -  11-24 @ 07:00  --------------------------------------------------------  IN: 690 mL / OUT: 0 mL / NET: 690 mL          PHYSICAL EXAM:    Constitutional: NAD  HEENT: PERRLA,   Neck: No JVD  Respiratory: CTA B/L  Cardiovascular: S1 and S2  Gastrointestinal: BS+, soft, NT/ND  Extremities: No peripheral edema  Neurological: A/O x 3, no focal deficits  Psychiatric: Normal mood, normal affect  : No Kidd  Skin: No rashes  Access: Not applicable  Back: No CVA tenderness    LABS:                        13.5   11.98 )-----------( 242      ( 24 Nov 2024 07:31 )             39.7     11-24    134[L]  |  98  |  16  ----------------------------<  92  3.4[L]   |  20[L]  |  0.56    Ca    9.1      24 Nov 2024 07:31    TPro  7.5  /  Alb  4.1  /  TBili  0.7  /  DBili  0.2  /  AST  16  /  ALT  18  /  AlkPhos  82  11-23          RADIOLOGY & ADDITIONAL STUDIES:

## 2024-11-24 NOTE — CONSULT NOTE ADULT - CONSULT REQUESTED BY NAME
medicine/surgery
[facility]
Dr. Ramos
Skin normal color for race, warm, dry and intact. No evidence of rash.

## 2024-11-24 NOTE — PROGRESS NOTE ADULT - ASSESSMENT
64 f with    Acute cholecystitis/ Abdominal pain  - CT abdomen noted  - Zosyn  - MRCP noted  - pain control  - GI follow DR Loya   - Surgical reevaluation   - clears   - CTa  no  PE     EKG changes  - cardiac enzymes noted   - Echo no significant findings  - Cardiology follow Dr Chanel     DVT prophylaxis.    d/w patient, daughter, consultant, ACP    Rad Ramos MD phone [phone]

## 2024-11-24 NOTE — PROGRESS NOTE ADULT - ASSESSMENT
64F with no significant PMH presents with epigastric pain radiating to the back since the morning. CT shows mild CBD dilation of 9mm and a duodenal diverticulum. Repeat CTAP shows findings c/f acute cholecystitis with contained perforation at the gallbladder neck.     Recommendations   - IV abx   - recommend IR consult for perc santo  - pain control PRN  - appreciate care per primary team     Plan discussed with attending Dr. Ledezma    Trauma/ACS  [phone]

## 2024-11-24 NOTE — PROGRESS NOTE ADULT - SUBJECTIVE AND OBJECTIVE BOX
Patient is a 64y old  Female who presents with a chief complaint of abd pain (24 Nov 2024 11:24)      SUBJECTIVE / OVERNIGHT EVENTS: c/o abdominal discomfort  Review of Systems  chest pain no  palpitations no  sob no  nausea no  headache no    MEDICATIONS  (STANDING):  aspirin enteric coated 81 milliGRAM(s) Oral daily  heparin   Injectable 5000 Unit(s) SubCutaneous every 8 hours  pantoprazole    Tablet 40 milliGRAM(s) Oral before breakfast  piperacillin/tazobactam IVPB.. 3.375 Gram(s) IV Intermittent every 8 hours  sodium chloride 0.9%. 500 milliLiter(s) (100 mL/Hr) IV Continuous <Continuous>    MEDICATIONS  (PRN):  acetaminophen     Tablet .. 650 milliGRAM(s) Oral every 6 hours PRN Temp greater or equal to 38.5C (101.3F), Mild Pain (1 - 3)  morphine  - Injectable 2 milliGRAM(s) IV Push every 6 hours PRN Severe Pain (7 - 10)  oxycodone    5 mG/acetaminophen 325 mG 1 Tablet(s) Oral every 6 hours PRN Moderate Pain (4 - 6)      Vital Signs Last 24 Hrs  T(C): 36.9 (24 Nov 2024 10:30), Max: 37.6 (23 Nov 2024 20:47)  T(F): 98.4 (24 Nov 2024 10:30), Max: 99.7 (23 Nov 2024 20:47)  HR: 118 (24 Nov 2024 10:30) (107 - 122)  BP: 120/75 (24 Nov 2024 10:30) (113/75 - 120/75)  BP(mean): --  RR: 18 (24 Nov 2024 10:30) (18 - 18)  SpO2: 93% (24 Nov 2024 10:30) (93% - 93%)    Parameters below as of 24 Nov 2024 10:30  Patient On (Oxygen Delivery Method): room air        PHYSICAL EXAM:  GENERAL: NAD, well-developed  HEAD:  Atraumatic, Normocephalic  EYES: EOMI, PERRLA, conjunctiva and sclera clear  NECK: Supple, No JVD  CHEST/LUNG: Clear to auscultation bilaterally; No wheeze  HEART: Regular rate and rhythm; No murmurs, rubs, or gallops  ABDOMEN: Soft, RUQ tender, Nondistended; Bowel sounds present  EXTREMITIES:  2+ Peripheral Pulses, No clubbing, cyanosis, or edema  PSYCH: AAOx3  NEUROLOGY: non-focal  SKIN: No rashes or lesions    LABS:                        13.5   11.98 )-----------( 242      ( 24 Nov 2024 07:31 )             39.7     11-24    134[L]  |  98  |  16  ----------------------------<  92  3.4[L]   |  20[L]  |  0.56    Ca    9.1      24 Nov 2024 07:31    TPro  7.5  /  Alb  4.1  /  TBili  0.7  /  DBili  0.2  /  AST  16  /  ALT  18  /  AlkPhos  82  11-23          Urinalysis Basic - ( 24 Nov 2024 07:31 )    Color: x / Appearance: x / SG: x / pH: x  Gluc: 92 mg/dL / Ketone: x  / Bili: x / Urobili: x   Blood: x / Protein: x / Nitrite: x   Leuk Esterase: x / RBC: x / WBC x   Sq Epi: x / Non Sq Epi: x / Bacteria: x          RADIOLOGY & ADDITIONAL TESTS:    Imaging Personally Reviewed:  < from: CT Angio Chest PE Protocol w/ IV Cont (11.24.24 @ 11:26) >  IMPRESSION:  No pulmonary embolism.    Findings suggestive of acute cholecystitis with possible contained   perforations or abscesses, are unchanged in size and better evaluated on   prior CT abdomen and pelvis from 11/23/2024.    < end of copied text >  < from: CT Abdomen and Pelvis w/ IV Cont (11.23.24 @ 18:35) >  IMPRESSION:  Increased gallbladder wall edema and pericholecystic fluid, gallbladder   distention, and liver hyperemia adjacent to the gallbladder fossa. These   findings are concerning for acute cholecystitis.    New outpouchings along the anterior portion of the bladder neck measuring   up to 2.1 cm and 2.4 cm, concerning for contained perforation.    No evidence of acute gastrointestinal hemorrhage.    < end of copied text >    Consultant(s) Notes Reviewed:      Care Discussed with Consultants/Other Providers:

## 2024-11-24 NOTE — PROGRESS NOTE ADULT - SUBJECTIVE AND OBJECTIVE BOX
CARDIOLOGY FOLLOW UP NOTE - DR. KING    Patient Name: FELICIANO MEYER    Date of Service: 24 @ 11:08    Patient seen and examined    Subjective:    cv: denies chest pain, dyspnea, palpitations, dizziness  pulmonary: denies cough  GI: denies abdominal pain, nausea, vomiting  vascular/legs: no edema   skin: no rash  ROS: otherwise negative   overnight events:      PHYSICAL EXAM:  T(C): 36.9 (24 @ 10:30), Max: 37.6 (24 @ 20:47)  HR: 118 (24 @ 10:30) (107 - 122)  BP: 120/75 (24 @ 10:30) (113/75 - 120/75)  RR: 18 (24 @ 10:30) (18 - 18)  SpO2: 93% (24 @ 10:30) (93% - 93%)  Wt(kg): --  I&O's Summary    2024 07:01  -  2024 07:00  --------------------------------------------------------  IN: 690 mL / OUT: 0 mL / NET: 690 mL      Daily     Daily Weight in k (2024 08:44)    Appearance: Normal	  Cardiovascular: Normal S1 S2,RRR, No JVD, No murmurs  Respiratory: Lungs clear to auscultation	  Gastrointestinal:  Soft, Non-tender, + BS	  Extremities: Normal range of motion, No clubbing, cyanosis or edema      Home Medications:      MEDICATIONS  (STANDING):  aspirin enteric coated 81 milliGRAM(s) Oral daily  heparin   Injectable 5000 Unit(s) SubCutaneous every 8 hours  pantoprazole    Tablet 40 milliGRAM(s) Oral before breakfast  piperacillin/tazobactam IVPB. 3.375 Gram(s) IV Intermittent once  piperacillin/tazobactam IVPB.- 3.375 Gram(s) IV Intermittent once  piperacillin/tazobactam IVPB.. 3.375 Gram(s) IV Intermittent every 8 hours  potassium chloride   Solution 40 milliEquivalent(s) Oral every 4 hours  sodium chloride 0.9%. 500 milliLiter(s) (100 mL/Hr) IV Continuous <Continuous>      TELEMETRY: 	    ECG:  	  RADIOLOGY:   DIAGNOSTIC TESTING:  [ ] Echocardiogram:  [ ] Catheterization:  [ ] Stress Test:    OTHER: 	    LABS:	 	    CARDIAC MARKERS:        Troponin T, High Sensitivity Result: <6 ng/L ( @ 01:50)  Troponin T, High Sensitivity Result: <6 ng/L ( @ 19:32)  Troponin T, High Sensitivity Result: <6 ng/L ( @ 11:47)                                13.5   11.98 )-----------( 242      ( 2024 07:31 )             39.7         134[L]  |  98  |  16  ----------------------------<  92  3.4[L]   |  20[L]  |  0.56    Ca    9.1      2024 07:31    TPro  7.5  /  Alb  4.1  /  TBili  0.7  /  DBili  0.2  /  AST  16  /  ALT  18  /  AlkPhos  82      proBNP:     Lipid Profile:   HgA1c:     Creatinine: 0.56 mg/dL (24 @ 07:31)  Creatinine: 0.66 mg/dL (24 @ 07:36)  Creatinine: 0.56 mg/dL (24 @ 12:26)  Creatinine: 0.63 mg/dL (24 @ 11:47)

## 2024-11-24 NOTE — PATIENT PROFILE ADULT - HISTORY OF COVID-19 VACCINATION
Throat culture sent  Alinity sent  OTC supportive care  Follow up with PCP   If worse seek treatment       Vaccine status unknown

## 2024-11-24 NOTE — PROGRESS NOTE ADULT - ASSESSMENT
A/P  65 yo F no PMH here with epigastric and abdominal pain radiating to the back since 0700 today. Pain is constant and worse w eating assoc w nausea and few episodes of emesis. Patient reports SOB from the abdominal pain, not being able to take a full breath in.      #Epigastric/Abdominal pain, N/V  -pleuritic pain, referred from abdomen  -HST x3 negative, CK, CKMB normal   -EKG shows NSR HR 86 with no acute ischemic changes when compared to prior EKG August 2023  -TTE normal lv fxn, EF 74%  -CXR shows clear lungs  -CT A/P shows Mild extrahepatic biliary ductal dilatation with normal distal tapering at the ampulla, unchanged from 8/21/2023. Note is made of an adjacent periampullary duodenal diverticulum, possibly the cause of the mildly dilated CBD  -MR MRCP noted   -GI/Surgery f/u   -hydrate, adv diet per gi  -d/w fam at bedside  -d/w acp   -f/u cta    dvt ppx      55 minutes spent on total encounter; more than 50% of the visit was spent counseling and/or coordinating care by the attending physician.

## 2024-11-25 LAB
ANION GAP SERPL CALC-SCNC: 14 MMOL/L — SIGNIFICANT CHANGE UP (ref 5–17)
BUN SERPL-MCNC: 11 MG/DL — SIGNIFICANT CHANGE UP (ref 7–23)
CALCIUM SERPL-MCNC: 9 MG/DL — SIGNIFICANT CHANGE UP (ref 8.4–10.5)
CHLORIDE SERPL-SCNC: 104 MMOL/L — SIGNIFICANT CHANGE UP (ref 96–108)
CO2 SERPL-SCNC: 18 MMOL/L — LOW (ref 22–31)
CREAT SERPL-MCNC: 0.59 MG/DL — SIGNIFICANT CHANGE UP (ref 0.5–1.3)
EGFR: 99 ML/MIN/1.73M2 — SIGNIFICANT CHANGE UP
GLUCOSE SERPL-MCNC: 88 MG/DL — SIGNIFICANT CHANGE UP (ref 70–99)
HCT VFR BLD CALC: 38.7 % — SIGNIFICANT CHANGE UP (ref 34.5–45)
HGB BLD-MCNC: 13.1 G/DL — SIGNIFICANT CHANGE UP (ref 11.5–15.5)
INR BLD: 1.04 RATIO — SIGNIFICANT CHANGE UP (ref 0.85–1.16)
MCHC RBC-ENTMCNC: 30.9 PG — SIGNIFICANT CHANGE UP (ref 27–34)
MCHC RBC-ENTMCNC: 33.9 G/DL — SIGNIFICANT CHANGE UP (ref 32–36)
MCV RBC AUTO: 91.3 FL — SIGNIFICANT CHANGE UP (ref 80–100)
NRBC # BLD: 0 /100 WBCS — SIGNIFICANT CHANGE UP (ref 0–0)
PLATELET # BLD AUTO: 282 K/UL — SIGNIFICANT CHANGE UP (ref 150–400)
POTASSIUM SERPL-MCNC: 3.9 MMOL/L — SIGNIFICANT CHANGE UP (ref 3.5–5.3)
POTASSIUM SERPL-SCNC: 3.9 MMOL/L — SIGNIFICANT CHANGE UP (ref 3.5–5.3)
PROTHROM AB SERPL-ACNC: 11.9 SEC — SIGNIFICANT CHANGE UP (ref 9.9–13.4)
RBC # BLD: 4.24 M/UL — SIGNIFICANT CHANGE UP (ref 3.8–5.2)
RBC # FLD: 12.3 % — SIGNIFICANT CHANGE UP (ref 10.3–14.5)
SODIUM SERPL-SCNC: 136 MMOL/L — SIGNIFICANT CHANGE UP (ref 135–145)
WBC # BLD: 8.25 K/UL — SIGNIFICANT CHANGE UP (ref 3.8–10.5)
WBC # FLD AUTO: 8.25 K/UL — SIGNIFICANT CHANGE UP (ref 3.8–10.5)

## 2024-11-25 PROCEDURE — 78227 HEPATOBIL SYST IMAGE W/DRUG: CPT | Mod: 26

## 2024-11-25 RX ORDER — SODIUM CHLORIDE 9 MG/ML
500 INJECTION, SOLUTION INTRAMUSCULAR; INTRAVENOUS; SUBCUTANEOUS
Refills: 0 | Status: DISCONTINUED | OUTPATIENT
Start: 2024-11-25 | End: 2024-11-25

## 2024-11-25 RX ADMIN — PIPERACILLIN SODIUM AND TAZOBACTAM SODIUM 25 GRAM(S): 4; .5 INJECTION, POWDER, LYOPHILIZED, FOR SOLUTION INTRAVENOUS at 04:50

## 2024-11-25 RX ADMIN — PANTOPRAZOLE SODIUM 40 MILLIGRAM(S): 40 TABLET, DELAYED RELEASE ORAL at 05:01

## 2024-11-25 RX ADMIN — Medication 5000 UNIT(S): at 13:33

## 2024-11-25 RX ADMIN — Medication 5000 UNIT(S): at 20:58

## 2024-11-25 RX ADMIN — PIPERACILLIN SODIUM AND TAZOBACTAM SODIUM 25 GRAM(S): 4; .5 INJECTION, POWDER, LYOPHILIZED, FOR SOLUTION INTRAVENOUS at 13:33

## 2024-11-25 RX ADMIN — SODIUM CHLORIDE 100 MILLILITER(S): 9 INJECTION, SOLUTION INTRAMUSCULAR; INTRAVENOUS; SUBCUTANEOUS at 16:23

## 2024-11-25 RX ADMIN — PIPERACILLIN SODIUM AND TAZOBACTAM SODIUM 25 GRAM(S): 4; .5 INJECTION, POWDER, LYOPHILIZED, FOR SOLUTION INTRAVENOUS at 20:57

## 2024-11-25 NOTE — PROGRESS NOTE ADULT - SUBJECTIVE AND OBJECTIVE BOX
CARDIOLOGY FOLLOW UP - Dr. Chanel  DATE OF SERVICE: 11/25/24    CC no CP or SOB      REVIEW OF SYSTEMS:  CONSTITUTIONAL: No fever, weight loss, or fatigue  RESPIRATORY: No cough, wheezing, chills or hemoptysis; No Shortness of Breath  CARDIOVASCULAR: No chest pain, palpitations, passing out, dizziness  GASTROINTESTINAL: No abdominal or epigastric pain. No nausea, vomiting, or hematemesis; No diarrhea or constipation. No melena or hematochezia.      PHYSICAL EXAM:  T(C): 36.4 (11-25-24 @ 04:41), Max: 36.9 (11-24-24 @ 20:16)  HR: 87 (11-25-24 @ 04:41) (87 - 109)  BP: 113/70 (11-25-24 @ 04:41) (113/70 - 117/75)  RR: 18 (11-25-24 @ 04:41) (18 - 18)  SpO2: 95% (11-25-24 @ 04:41) (93% - 95%)  Wt(kg): --  I&O's Summary    24 Nov 2024 07:01  -  25 Nov 2024 07:00  --------------------------------------------------------  IN: 720 mL / OUT: 0 mL / NET: 720 mL    25 Nov 2024 07:01  -  25 Nov 2024 13:33  --------------------------------------------------------  IN: 0 mL / OUT: 0 mL / NET: 0 mL        Appearance: Normal	  Cardiovascular: Normal S1 S2,RRR, No JVD, No murmurs  Respiratory: Lungs clear to auscultation	  Gastrointestinal:  Soft, Non-tender, + BS	  Extremities: Normal range of motion, No clubbing, cyanosis or edema      Home Medications:      MEDICATIONS  (STANDING):  heparin   Injectable 5000 Unit(s) SubCutaneous every 8 hours  influenza  Vaccine (HIGH DOSE) 0.5 milliLiter(s) IntraMuscular once  pantoprazole    Tablet 40 milliGRAM(s) Oral before breakfast  piperacillin/tazobactam IVPB.. 3.375 Gram(s) IV Intermittent every 8 hours  sodium chloride 0.9%. 500 milliLiter(s) (100 mL/Hr) IV Continuous <Continuous>      TELEMETRY: 	  NSR/ST  ECG:  	  RADIOLOGY:   < from: CT Angio Chest PE Protocol w/ IV Cont (11.24.24 @ 11:26) >  IMPRESSION:  No pulmonary embolism.    Findings suggestive of acute cholecystitis with possible contained   perforations or abscesses, are unchanged in size and better evaluated on   prior CT abdomen and pelvis from 11/23/2024.    < end of copied text >    DIAGNOSTIC TESTING:  [ ] Echocardiogram:  [ ]  Catheterization:  [ ] Stress Test:    OTHER: 	  < from: NM Hepatobiliary Imaging w/ RX (11.25.24 @ 12:54) >  IMPRESSION: Findings compatible with acute cholecystitis.    < end of copied text >    LABS:	 	    Troponin T, High Sensitivity Result: <6 ng/L [0 - 51] (11-22 @ 01:50)  CKMB Units: 1.2 ng/mL [0.0 - 3.8] (11-21 @ 19:32)  Troponin T, High Sensitivity Result: <6 ng/L [0 - 51] (11-21 @ 19:32)  Troponin T, High Sensitivity Result: <6 ng/L [0 - 51] (11-21 @ 11:47)                          13.1   8.25  )-----------( 282      ( 25 Nov 2024 07:18 )             38.7     11-25    136  |  104  |  11  ----------------------------<  88  3.9   |  18[L]  |  0.59    Ca    9.0      25 Nov 2024 07:19      PT/INR - ( 25 Nov 2024 07:18 )   PT: 11.9 sec;   INR: 1.04 ratio

## 2024-11-25 NOTE — PROGRESS NOTE ADULT - ASSESSMENT
64F with no significant PMH presents with epigastric pain radiating to the back since the morning. CT shows mild CBD dilation of 9mm and a duodenal diverticulum. Repeat CTAP shows findings c/f acute cholecystitis with contained perforation at the gallbladder neck.     Recommendations   - continue IV abx   - recommend IR consult for perc santo. f/u IR recs  - pain control PRN  - appreciate care per primary team       Trauma/ACS  [phone]

## 2024-11-25 NOTE — PROGRESS NOTE ADULT - ASSESSMENT
64 f with    Acute cholecystitis/ Abdominal pain  - CT abdomen noted  - Zosyn  - MRCP noted  - pain control  - GI follow DR Loya   - Surgical reevaluation noted  - clears   - CTa  no  PE   - perc cholecystostomy by IR pending     EKG changes  - cardiac enzymes noted   - Echo no significant findings  - Cardiology follow Dr Chanel     DVT prophylaxis.    d/w patient, daughter, consultant, ACP    Rad Ramos MD phone [phone]

## 2024-11-25 NOTE — PROGRESS NOTE ADULT - ASSESSMENT
[facility]  now with santo, ? perforation  await surgical deciison  abx restarted

## 2024-11-25 NOTE — PROGRESS NOTE ADULT - ASSESSMENT
A/P  63 yo F no PMH here with epigastric and abdominal pain radiating to the back since 0700 today. Pain is constant and worse w eating assoc w nausea and few episodes of emesis. Patient reports SOB from the abdominal pain, not being able to take a full breath in.      #Epigastric/Abdominal pain, N/V  -pleuritic pain, referred from abdomen  -HST x3 negative, CK, CKMB normal   -EKG shows NSR HR 86 with no acute ischemic changes when compared to prior EKG August 2023  -TTE normal lv fxn, EF 74%  -CXR shows clear lungs  -CT A/P shows Mild extrahepatic biliary ductal dilatation with normal distal tapering at the ampulla, unchanged from 8/21/2023. Note is made of an adjacent periampullary duodenal diverticulum, possibly the cause of the mildly dilated CBD  -MR MRCP noted   -CT Angio Chest shows no PE. Findings suggestive of acute cholecystitis with possible contained perforations or abscesses, are unchanged in size and better evaluated on prior CT abdomen and pelvis from 11/23/2024.  -HIDA scan findings compatible with acute cholecystitis  -GI/Surgery f/u   -hydrate, diet per gi    d/w fam at bedside      dvt ppx

## 2024-11-25 NOTE — PROGRESS NOTE ADULT - SUBJECTIVE AND OBJECTIVE BOX
Surgery Progress Note     Subjective:  Patient seen and examined.       Vital Signs:  Vital Signs Last 24 Hrs  T(C): 36.4 (25 Nov 2024 04:41), Max: 36.9 (24 Nov 2024 20:16)  T(F): 97.5 (25 Nov 2024 04:41), Max: 98.4 (24 Nov 2024 20:16)  HR: 87 (25 Nov 2024 04:41) (87 - 109)  BP: 113/70 (25 Nov 2024 04:41) (113/70 - 117/75)  RR: 18 (25 Nov 2024 04:41) (18 - 18)  SpO2: 95% (25 Nov 2024 04:41) (93% - 95%)    Parameters below as of 25 Nov 2024 04:41  Patient On (Oxygen Delivery Method): room air        CAPILLARY BLOOD GLUCOSE          I&O's Detail    24 Nov 2024 07:01  -  25 Nov 2024 07:00  --------------------------------------------------------  IN:    Oral Fluid: 720 mL  Total IN: 720 mL    OUT:  Total OUT: 0 mL    Total NET: 720 mL            Physical Exam:  General: NAD, resting comfortably in bed  Respiratory: Nonlabored respirations  Cardio: pulse present  Abdomen: softly distended, mild TTP in RUQ  Vascular: extremities are warm and well perfused.     Labs:    11-25    136  |  104  |  11  ----------------------------<  88  3.9   |  18[L]  |  0.59    Ca    9.0      25 Nov 2024 07:19                              13.1   8.25  )-----------( 282      ( 25 Nov 2024 07:18 )             38.7     PT/INR - ( 25 Nov 2024 07:18 )   PT: 11.9 sec;   INR: 1.04 ratio

## 2024-11-25 NOTE — PROGRESS NOTE ADULT - SUBJECTIVE AND OBJECTIVE BOX
Patient is a 64y old  Female who presents with a chief complaint of abd pain (25 Nov 2024 08:58)      SUBJECTIVE / OVERNIGHT EVENTS: No new complaints. Family at bedside.   Review of Systems  chest pain no  palpitations no  sob no  nausea no  headache no    MEDICATIONS  (STANDING):  heparin   Injectable 5000 Unit(s) SubCutaneous every 8 hours  influenza  Vaccine (HIGH DOSE) 0.5 milliLiter(s) IntraMuscular once  pantoprazole    Tablet 40 milliGRAM(s) Oral before breakfast  piperacillin/tazobactam IVPB.. 3.375 Gram(s) IV Intermittent every 8 hours    MEDICATIONS  (PRN):  acetaminophen     Tablet .. 650 milliGRAM(s) Oral every 6 hours PRN Temp greater or equal to 38.5C (101.3F), Mild Pain (1 - 3)  morphine  - Injectable 2 milliGRAM(s) IV Push every 6 hours PRN Severe Pain (7 - 10)  oxycodone    5 mG/acetaminophen 325 mG 1 Tablet(s) Oral every 6 hours PRN Moderate Pain (4 - 6)      Vital Signs Last 24 Hrs  T(C): 36.5 (25 Nov 2024 20:31), Max: 36.5 (25 Nov 2024 20:31)  T(F): 97.7 (25 Nov 2024 20:31), Max: 97.7 (25 Nov 2024 20:31)  HR: 99 (25 Nov 2024 20:31) (87 - 99)  BP: 115/74 (25 Nov 2024 20:31) (113/70 - 115/74)  BP(mean): --  RR: 18 (25 Nov 2024 20:31) (18 - 18)  SpO2: 95% (25 Nov 2024 20:31) (95% - 95%)    Parameters below as of 25 Nov 2024 20:31  Patient On (Oxygen Delivery Method): room air        PHYSICAL EXAM:  GENERAL: NAD, well-developed  HEAD:  Atraumatic, Normocephalic  EYES: EOMI, PERRLA, conjunctiva and sclera clear  NECK: Supple, No JVD  CHEST/LUNG: Clear to auscultation bilaterally; No wheeze  HEART: Regular rate and rhythm; No murmurs, rubs, or gallops  ABDOMEN: Soft, RUQ tender, Nondistended; Bowel sounds present  EXTREMITIES:  2+ Peripheral Pulses, No clubbing, cyanosis, or edema  PSYCH: AAOx3  NEUROLOGY: non-focal  SKIN: No rashes or lesions    LABS:                        13.1   8.25  )-----------( 282      ( 25 Nov 2024 07:18 )             38.7     11-25    136  |  104  |  11  ----------------------------<  88  3.9   |  18[L]  |  0.59    Ca    9.0      25 Nov 2024 07:19      PT/INR - ( 25 Nov 2024 07:18 )   PT: 11.9 sec;   INR: 1.04 ratio               Urinalysis Basic - ( 25 Nov 2024 07:19 )    Color: x / Appearance: x / SG: x / pH: x  Gluc: 88 mg/dL / Ketone: x  / Bili: x / Urobili: x   Blood: x / Protein: x / Nitrite: x   Leuk Esterase: x / RBC: x / WBC x   Sq Epi: x / Non Sq Epi: x / Bacteria: x          RADIOLOGY & ADDITIONAL TESTS:    Imaging Personally Reviewed:  < from: NM Hepatobiliary Imaging w/ RX (11.25.24 @ 12:54) >  IMPRESSION: Findings compatible with acute cholecystitis.    < end of copied text >    Consultant(s) Notes Reviewed:      Care Discussed with Consultants/Other Providers:

## 2024-11-25 NOTE — PROGRESS NOTE ADULT - SUBJECTIVE AND OBJECTIVE BOX
Patient is a 64y Female     Patient is a 64y old  Female who presents with a chief complaint of abd pain (24 Nov 2024 11:24)      HPI:  64F no PMH here with epigastric pain radiating to the back since 0700 today. Pain is constant and worse w eating assoc w nausea and few episodes of emesis. NO fevers. No hematemesis. No diarrhea, No fevers. Not on any meds, No EtOH. No prior abd surg. (21 Nov 2024 17:49)      PAST MEDICAL & SURGICAL HISTORY:  Fall, subsequent encounter      No significant past surgical history          MEDICATIONS  (STANDING):  heparin   Injectable 5000 Unit(s) SubCutaneous every 8 hours  influenza  Vaccine (HIGH DOSE) 0.5 milliLiter(s) IntraMuscular once  pantoprazole    Tablet 40 milliGRAM(s) Oral before breakfast  piperacillin/tazobactam IVPB.. 3.375 Gram(s) IV Intermittent every 8 hours  sodium chloride 0.9%. 500 milliLiter(s) (100 mL/Hr) IV Continuous <Continuous>      Allergies    No Known Allergies    Intolerances        SOCIAL HISTORY:  Denies ETOh,Smoking,     FAMILY HISTORY:      REVIEW OF SYSTEMS:    CONSTITUTIONAL: No weakness, fevers or chills  EYES/ENT: No visual changes;  No vertigo or throat pain   NECK: No pain or stiffness  RESPIRATORY: No cough, wheezing, hemoptysis; No shortness of breath  CARDIOVASCULAR: No chest pain or palpitations  GASTROINTESTINAL: No abdominal or epigastric pain. No nausea, vomiting, or hematemesis; No diarrhea or constipation. No melena or hematochezia.  GENITOURINARY: No dysuria, frequency or hematuria  NEUROLOGICAL: No numbness or weakness  SKIN: No itching, burning, rashes, or lesions   All other review of systems is negative unless indicated above.    VITAL:  T(C): , Max: 36.9 (11-24-24 @ 10:30)  T(F): , Max: 98.4 (11-24-24 @ 10:30)  HR: 87 (11-25-24 @ 04:41)  BP: 113/70 (11-25-24 @ 04:41)  BP(mean): --  RR: 18 (11-25-24 @ 04:41)  SpO2: 95% (11-25-24 @ 04:41)  Wt(kg): --    I and O's:    11-23 @ 07:01 - 11-24 @ 07:00  --------------------------------------------------------  IN: 690 mL / OUT: 0 mL / NET: 690 mL    11-24 @ 07:01 - 11-25 @ 07:00  --------------------------------------------------------  IN: 720 mL / OUT: 0 mL / NET: 720 mL          PHYSICAL EXAM:    Constitutional: NAD  HEENT: PERRLA,   Neck: No JVD  Respiratory: CTA B/L  Cardiovascular: S1 and S2  Gastrointestinal: BS+, soft, NT/ND  Extremities: No peripheral edema  Neurological: A/O x 3, no focal deficits  Psychiatric: Normal mood, normal affect  : No Kidd  Skin: No rashes  Access: Not applicable  Back: No CVA tenderness    LABS:                        13.1   8.25  )-----------( 282      ( 25 Nov 2024 07:18 )             38.7     11-25    136  |  104  |  11  ----------------------------<  88  3.9   |  18[L]  |  0.59    Ca    9.0      25 Nov 2024 07:19            RADIOLOGY & ADDITIONAL STUDIES:

## 2024-11-25 NOTE — CHART NOTE - NSCHARTNOTEFT_GEN_A_CORE
IR Follow-Up     - IR consulted for cholecystostomy tube placement due to perforation seen on imaging  - HIDA obtained is consistent with acute cholecystitis  - Patient had been on aspirin which requires 5 day hold for perc santo to reduce bleeding risk - last dose yesterday 11/24.  - Will add patient for percutaneous cholecystostomy tube for Friday 11/29.   - Patient remains hemodynamically stable - WBC normalized  - please place IR procedure order under Dr. Capellan  - STAT labs in AM (cbc,coags, bmp, T&S)  - hold DVT PPX x 12 hours  - NPO on Thursday 11/28 at 11pm  - d/w primary team  - If patient clinically decompensates please contact IR for more urgent intervention     --  Mak Bethea NP  Interventional Radiology  Available on [employer] TEAMS / [phone]    For EMERGENT inquiries/questions:  IR Pager ([facility]): [phone]    For non-emergent consults/questions:   Please place a sunrise order "Consult- Interventional Radiology" with an appropriate callback number    For questions about scheduling during appropriate work hours, call IR :  [facility]: [phone]    For outpatient IR booking:  [facility]: [phone]

## 2024-11-26 LAB
ANION GAP SERPL CALC-SCNC: 16 MMOL/L — SIGNIFICANT CHANGE UP (ref 5–17)
APTT BLD: 30 SEC — SIGNIFICANT CHANGE UP (ref 24.5–35.6)
BLD GP AB SCN SERPL QL: NEGATIVE — SIGNIFICANT CHANGE UP
BUN SERPL-MCNC: 14 MG/DL — SIGNIFICANT CHANGE UP (ref 7–23)
CALCIUM SERPL-MCNC: 9.5 MG/DL — SIGNIFICANT CHANGE UP (ref 8.4–10.5)
CHLORIDE SERPL-SCNC: 100 MMOL/L — SIGNIFICANT CHANGE UP (ref 96–108)
CO2 SERPL-SCNC: 20 MMOL/L — LOW (ref 22–31)
CREAT SERPL-MCNC: 0.62 MG/DL — SIGNIFICANT CHANGE UP (ref 0.5–1.3)
EGFR: 98 ML/MIN/1.73M2 — SIGNIFICANT CHANGE UP
GLUCOSE SERPL-MCNC: 90 MG/DL — SIGNIFICANT CHANGE UP (ref 70–99)
HCT VFR BLD CALC: 39.1 % — SIGNIFICANT CHANGE UP (ref 34.5–45)
HGB BLD-MCNC: 13.1 G/DL — SIGNIFICANT CHANGE UP (ref 11.5–15.5)
INR BLD: 1.03 RATIO — SIGNIFICANT CHANGE UP (ref 0.85–1.16)
MAGNESIUM SERPL-MCNC: 2.4 MG/DL — SIGNIFICANT CHANGE UP (ref 1.6–2.6)
MCHC RBC-ENTMCNC: 30.5 PG — SIGNIFICANT CHANGE UP (ref 27–34)
MCHC RBC-ENTMCNC: 33.5 G/DL — SIGNIFICANT CHANGE UP (ref 32–36)
MCV RBC AUTO: 91.1 FL — SIGNIFICANT CHANGE UP (ref 80–100)
NRBC # BLD: 0 /100 WBCS — SIGNIFICANT CHANGE UP (ref 0–0)
PHOSPHATE SERPL-MCNC: 3.6 MG/DL — SIGNIFICANT CHANGE UP (ref 2.5–4.5)
PLATELET # BLD AUTO: 314 K/UL — SIGNIFICANT CHANGE UP (ref 150–400)
POTASSIUM SERPL-MCNC: 3.6 MMOL/L — SIGNIFICANT CHANGE UP (ref 3.5–5.3)
POTASSIUM SERPL-SCNC: 3.6 MMOL/L — SIGNIFICANT CHANGE UP (ref 3.5–5.3)
PROTHROM AB SERPL-ACNC: 11.8 SEC — SIGNIFICANT CHANGE UP (ref 9.9–13.4)
RBC # BLD: 4.29 M/UL — SIGNIFICANT CHANGE UP (ref 3.8–5.2)
RBC # FLD: 12.5 % — SIGNIFICANT CHANGE UP (ref 10.3–14.5)
RH IG SCN BLD-IMP: POSITIVE — SIGNIFICANT CHANGE UP
SODIUM SERPL-SCNC: 136 MMOL/L — SIGNIFICANT CHANGE UP (ref 135–145)
WBC # BLD: 7.49 K/UL — SIGNIFICANT CHANGE UP (ref 3.8–10.5)
WBC # FLD AUTO: 7.49 K/UL — SIGNIFICANT CHANGE UP (ref 3.8–10.5)

## 2024-11-26 PROCEDURE — 99233 SBSQ HOSP IP/OBS HIGH 50: CPT | Mod: 57

## 2024-11-26 RX ADMIN — Medication 5000 UNIT(S): at 21:06

## 2024-11-26 RX ADMIN — PIPERACILLIN SODIUM AND TAZOBACTAM SODIUM 25 GRAM(S): 4; .5 INJECTION, POWDER, LYOPHILIZED, FOR SOLUTION INTRAVENOUS at 05:02

## 2024-11-26 RX ADMIN — PANTOPRAZOLE SODIUM 40 MILLIGRAM(S): 40 TABLET, DELAYED RELEASE ORAL at 05:05

## 2024-11-26 RX ADMIN — Medication 5000 UNIT(S): at 13:38

## 2024-11-26 RX ADMIN — Medication 5000 UNIT(S): at 04:58

## 2024-11-26 RX ADMIN — PIPERACILLIN SODIUM AND TAZOBACTAM SODIUM 25 GRAM(S): 4; .5 INJECTION, POWDER, LYOPHILIZED, FOR SOLUTION INTRAVENOUS at 13:39

## 2024-11-26 RX ADMIN — PIPERACILLIN SODIUM AND TAZOBACTAM SODIUM 25 GRAM(S): 4; .5 INJECTION, POWDER, LYOPHILIZED, FOR SOLUTION INTRAVENOUS at 21:07

## 2024-11-26 NOTE — PROGRESS NOTE ADULT - ASSESSMENT
64 f with    Acute cholecystitis/ Abdominal pain  - CT abdomen noted  - Zosyn  - MRCP noted  - pain control  - GI follow DR Loya   - Surgical reevaluation noted  - clears   - CTa  no  PE   - OR pending for cholecystectomy.     EKG changes  - cardiac enzymes noted   - Echo no significant findings  - Cardiology follow Dr Chanel     DVT prophylaxis.    No acute medical condition identified to postpone planned surgical intervention.     d/w patient, sister, ACP    Rad Ramos MD phone [phone]

## 2024-11-26 NOTE — PROGRESS NOTE ADULT - SUBJECTIVE AND OBJECTIVE BOX
Patient is a 64y old  Female who presents with a chief complaint of cholecystisits (26 Nov 2024 06:45)      SUBJECTIVE / OVERNIGHT EVENTS: No new complaints. Sister at bedside.   Review of Systems  chest pain no  palpitations no  sob no  nausea no  headache no    MEDICATIONS  (STANDING):  heparin   Injectable 5000 Unit(s) SubCutaneous every 8 hours  influenza  Vaccine (HIGH DOSE) 0.5 milliLiter(s) IntraMuscular once  pantoprazole    Tablet 40 milliGRAM(s) Oral before breakfast  piperacillin/tazobactam IVPB.. 3.375 Gram(s) IV Intermittent every 8 hours    MEDICATIONS  (PRN):  acetaminophen     Tablet .. 650 milliGRAM(s) Oral every 6 hours PRN Temp greater or equal to 38.5C (101.3F), Mild Pain (1 - 3)  morphine  - Injectable 2 milliGRAM(s) IV Push every 6 hours PRN Severe Pain (7 - 10)  oxycodone    5 mG/acetaminophen 325 mG 1 Tablet(s) Oral every 6 hours PRN Moderate Pain (4 - 6)      Vital Signs Last 24 Hrs  T(C): 37.2 (26 Nov 2024 20:20), Max: 37.2 (26 Nov 2024 20:20)  T(F): 99 (26 Nov 2024 20:20), Max: 99 (26 Nov 2024 20:20)  HR: 101 (26 Nov 2024 20:20) (89 - 101)  BP: 124/78 (26 Nov 2024 20:20) (111/75 - 124/85)  BP(mean): --  RR: 18 (26 Nov 2024 20:20) (18 - 18)  SpO2: 94% (26 Nov 2024 20:20) (94% - 94%)    Parameters below as of 26 Nov 2024 20:20  Patient On (Oxygen Delivery Method): room air        PHYSICAL EXAM:  GENERAL: NAD, well-developed  HEAD:  Atraumatic, Normocephalic  EYES: EOMI, PERRLA, conjunctiva and sclera clear  NECK: Supple, No JVD  CHEST/LUNG: Clear to auscultation bilaterally; No wheeze  HEART: Regular rate and rhythm; No murmurs, rubs, or gallops  ABDOMEN: Soft, RUQ tender, Nondistended; Bowel sounds present  EXTREMITIES:  2+ Peripheral Pulses, No clubbing, cyanosis, or edema  PSYCH: AAOx3  NEUROLOGY: non-focal  SKIN: No rashes or lesions    LABS:                        13.1   7.49  )-----------( 314      ( 26 Nov 2024 07:12 )             39.1     11-26    136  |  100  |  14  ----------------------------<  90  3.6   |  20[L]  |  0.62    Ca    9.5      26 Nov 2024 07:12  Phos  3.6     11-26  Mg     2.4     11-26      PT/INR - ( 26 Nov 2024 07:12 )   PT: 11.8 sec;   INR: 1.03 ratio         PTT - ( 26 Nov 2024 07:12 )  PTT:30.0 sec      Urinalysis Basic - ( 26 Nov 2024 07:12 )    Color: x / Appearance: x / SG: x / pH: x  Gluc: 90 mg/dL / Ketone: x  / Bili: x / Urobili: x   Blood: x / Protein: x / Nitrite: x   Leuk Esterase: x / RBC: x / WBC x   Sq Epi: x / Non Sq Epi: x / Bacteria: x          RADIOLOGY & ADDITIONAL TESTS:    Imaging Personally Reviewed:    Consultant(s) Notes Reviewed:      Care Discussed with Consultants/Other Providers:

## 2024-11-26 NOTE — PROGRESS NOTE ADULT - SUBJECTIVE AND OBJECTIVE BOX
Patient is a 64y Female     Patient is a 64y old  Female who presents with a chief complaint of abd pain (25 Nov 2024 08:58)      HPI:  64F no PMH here with epigastric pain radiating to the back since 0700 today. Pain is constant and worse w eating assoc w nausea and few episodes of emesis. NO fevers. No hematemesis. No diarrhea, No fevers. Not on any meds, No EtOH. No prior abd surg. (21 Nov 2024 17:49)      PAST MEDICAL & SURGICAL HISTORY:  Fall, subsequent encounter      No significant past surgical history          MEDICATIONS  (STANDING):  heparin   Injectable 5000 Unit(s) SubCutaneous every 8 hours  influenza  Vaccine (HIGH DOSE) 0.5 milliLiter(s) IntraMuscular once  pantoprazole    Tablet 40 milliGRAM(s) Oral before breakfast  piperacillin/tazobactam IVPB.. 3.375 Gram(s) IV Intermittent every 8 hours      Allergies    No Known Allergies    Intolerances        SOCIAL HISTORY:  Denies ETOh,Smoking,     FAMILY HISTORY:      REVIEW OF SYSTEMS:    CONSTITUTIONAL: No weakness, fevers or chills  EYES/ENT: No visual changes;  No vertigo or throat pain   NECK: No pain or stiffness  RESPIRATORY: No cough, wheezing, hemoptysis; No shortness of breath  CARDIOVASCULAR: No chest pain or palpitations  GASTROINTESTINAL: No abdominal or epigastric pain. No nausea, vomiting, or hematemesis; No diarrhea or constipation. No melena or hematochezia.  GENITOURINARY: No dysuria, frequency or hematuria  NEUROLOGICAL: No numbness or weakness  SKIN: No itching, burning, rashes, or lesions   All other review of systems is negative unless indicated above.    VITAL:  T(C): , Max: 36.9 (11-26-24 @ 04:00)  T(F): , Max: 98.4 (11-26-24 @ 04:00)  HR: 89 (11-26-24 @ 04:00)  BP: 111/75 (11-26-24 @ 04:00)  BP(mean): --  RR: 18 (11-26-24 @ 04:00)  SpO2: 94% (11-26-24 @ 04:00)  Wt(kg): --    I and O's:    11-23 @ 07:01  -  11-24 @ 07:00  --------------------------------------------------------  IN: 690 mL / OUT: 0 mL / NET: 690 mL    11-24 @ 07:01 - 11-25 @ 07:00  --------------------------------------------------------  IN: 720 mL / OUT: 0 mL / NET: 720 mL    11-25 @ 07:01 - 11-26 @ 06:45  --------------------------------------------------------  IN: 100 mL / OUT: 0 mL / NET: 100 mL          PHYSICAL EXAM:    Constitutional: NAD  HEENT: PERRLA,   Neck: No JVD  Respiratory: CTA B/L  Cardiovascular: S1 and S2  Gastrointestinal: BS+, soft, NT/ND  Extremities: No peripheral edema  Neurological: A/O x 3, no focal deficits  Psychiatric: Normal mood, normal affect  : No Kidd  Skin: No rashes  Access: Not applicable  Back: No CVA tenderness    LABS:                        13.1   8.25  )-----------( 282      ( 25 Nov 2024 07:18 )             38.7     11-25    136  |  104  |  11  ----------------------------<  88  3.9   |  18[L]  |  0.59    Ca    9.0      25 Nov 2024 07:19            RADIOLOGY & ADDITIONAL STUDIES:

## 2024-11-26 NOTE — PROGRESS NOTE ADULT - SUBJECTIVE AND OBJECTIVE BOX
Surgery Progress Note     Subjective:  Patient seen and examined. Patient states she tolerated a diet. She states her abdominal pain has improved. She denies nausea, vomiting, chest pain, SOB.       Vital Signs:  Vital Signs Last 24 Hrs  T(C): 36.9 (26 Nov 2024 04:00), Max: 36.9 (26 Nov 2024 04:00)  T(F): 98.4 (26 Nov 2024 04:00), Max: 98.4 (26 Nov 2024 04:00)  HR: 89 (26 Nov 2024 04:00) (89 - 99)  BP: 111/75 (26 Nov 2024 04:00) (111/75 - 115/74)  RR: 18 (26 Nov 2024 04:00) (18 - 18)  SpO2: 94% (26 Nov 2024 04:00) (94% - 95%)    Parameters below as of 26 Nov 2024 04:00  Patient On (Oxygen Delivery Method): room air        CAPILLARY BLOOD GLUCOSE          I&O's Detail    25 Nov 2024 07:01  -  26 Nov 2024 07:00  --------------------------------------------------------  IN:    IV PiggyBack: 100 mL  Total IN: 100 mL    OUT:    Oral Fluid: 0 mL  Total OUT: 0 mL    Total NET: 100 mL            Physical Exam:  General: NAD, resting comfortably in bed  Respiratory: Nonlabored respirations  Cardio: pulse present  Abdomen: softly distended, NTTP  Vascular: extremities are warm and well perfused.       Labs:    11-26    136  |  100  |  14  ----------------------------<  90  3.6   |  20[L]  |  0.62    Ca    9.5      26 Nov 2024 07:12  Phos  3.6     11-26  Mg     2.4     11-26                              13.1   7.49  )-----------( 314      ( 26 Nov 2024 07:12 )             39.1     PT/INR - ( 26 Nov 2024 07:12 )   PT: 11.8 sec;   INR: 1.03 ratio         PTT - ( 26 Nov 2024 07:12 )  PTT:30.0 sec

## 2024-11-26 NOTE — PROGRESS NOTE ADULT - ASSESSMENT
A/P  63 yo F no PMH here with epigastric and abdominal pain radiating to the back since 0700 today. Pain is constant and worse w eating assoc w nausea and few episodes of emesis. Patient reports SOB from the abdominal pain, not being able to take a full breath in.      #Epigastric/Abdominal pain, N/V  -pleuritic pain, referred from abdomen  -HST x3 negative, CK, CKMB normal   -EKG shows NSR HR 86 with no acute ischemic changes when compared to prior EKG August 2023  -TTE normal lv fxn, EF 74%  -CXR shows clear lungs  -CT A/P shows Mild extrahepatic biliary ductal dilatation with normal distal tapering at the ampulla, unchanged from 8/21/2023. Note is made of an adjacent periampullary duodenal diverticulum, possibly the cause of the mildly dilated CBD  -MR MRCP noted   -CT Angio Chest shows no PE. Findings suggestive of acute cholecystitis with possible contained perforations or abscesses, are unchanged in size and better evaluated on prior CT abdomen and pelvis from 11/23/2024.  -HIDA scan findings compatible with acute cholecystitis  -GI/Surgery f/u   -per IR, plan for percutaneous cholecystostomy tube for Friday 11/29  -hydrate, diet per gi    d/w fam at bedside      dvt ppx

## 2024-11-26 NOTE — PROGRESS NOTE ADULT - ASSESSMENT
64F with no significant PMH presents with epigastric pain radiating to the back since the morning. CT shows mild CBD dilation of 9mm and a duodenal diverticulum. Repeat CTAP shows findings c/f acute cholecystitis with contained perforation at the gallbladder neck.     Recommendations   - continue IV abx   - recommend IR consult for perc santo. f/u IR recs  - pain control PRN  - appreciate care per primary team       Trauma/ACS  [phone]  64F with no significant PMH presents with epigastric pain radiating to the back since the morning. CT shows mild CBD dilation of 9mm and a duodenal diverticulum. Repeat CTAP shows findings c/f acute cholecystitis with contained perforation at the gallbladder neck.     Recommendations   - continue IV abx   - tentative plan for OR 11/27 for lap santo, possible open, possible intraoperative cholangiogram  - pain control PRN  - appreciate care per primary team       Trauma/ACS  [phone]

## 2024-11-26 NOTE — PROGRESS NOTE ADULT - ASSESSMENT
conservative gi magnte  no acute gi complaints  pt with need perc santo  scheduled on friday  abx for now  appreciate surgery

## 2024-11-26 NOTE — PRE PROCEDURE NOTE - PRE PROCEDURE EVALUATION
Preop Dx: acute cholecystitis  Surgeon: Dr. Siu  Procedure: lap santo    Vital Signs Last 24 Hrs  T(C): 36.8 (26 Nov 2024 12:36), Max: 36.9 (26 Nov 2024 04:00)  T(F): 98.2 (26 Nov 2024 12:36), Max: 98.4 (26 Nov 2024 04:00)  HR: 94 (26 Nov 2024 12:36) (89 - 99)  BP: 124/85 (26 Nov 2024 12:36) (111/75 - 124/85)  BP(mean): --  RR: 18 (26 Nov 2024 12:36) (18 - 18)  SpO2: 94% (26 Nov 2024 12:36) (94% - 95%)    Parameters below as of 26 Nov 2024 12:36  Patient On (Oxygen Delivery Method): room air                            13.1   7.49  )-----------( 314      ( 26 Nov 2024 07:12 )             39.1     11-26    136  |  100  |  14  ----------------------------<  90  3.6   |  20[L]  |  0.62    Ca    9.5      26 Nov 2024 07:12  Phos  3.6     11-26  Mg     2.4     11-26      PT/INR - ( 26 Nov 2024 07:12 )   PT: 11.8 sec;   INR: 1.03 ratio         PTT - ( 26 Nov 2024 07:12 )  PTT:30.0 sec    A/P: 64y Female p/w abdominal pain, CTAP shows findings c/f acute cholecystitis with contained perforation at the gallbladder neck, HIDA (+)    - OR tmrw for lap santo with Dr. Siu  - NPO past midnight, except medications  - IVF while NPO  - Consent signed and in chart  - Medical clearance for OR

## 2024-11-26 NOTE — PROGRESS NOTE ADULT - SUBJECTIVE AND OBJECTIVE BOX
CARDIOLOGY FOLLOW UP - Dr. Chanel  DATE OF SERVICE: 11/26/24    CC no CP or SOB      REVIEW OF SYSTEMS:  CONSTITUTIONAL: No fever, weight loss, or fatigue  RESPIRATORY: No cough, wheezing, chills or hemoptysis; No Shortness of Breath  CARDIOVASCULAR: No chest pain, palpitations, passing out, dizziness  GASTROINTESTINAL: No abdominal or epigastric pain. No nausea, vomiting, or hematemesis; No diarrhea or constipation. No melena or hematochezia.      PHYSICAL EXAM:  T(C): 36.9 (11-26-24 @ 04:00), Max: 36.9 (11-26-24 @ 04:00)  HR: 89 (11-26-24 @ 04:00) (89 - 99)  BP: 111/75 (11-26-24 @ 04:00) (111/75 - 115/74)  RR: 18 (11-26-24 @ 04:00) (18 - 18)  SpO2: 94% (11-26-24 @ 04:00) (94% - 95%)  Wt(kg): --  I&O's Summary    25 Nov 2024 07:01  -  26 Nov 2024 07:00  --------------------------------------------------------  IN: 100 mL / OUT: 0 mL / NET: 100 mL        Appearance: Normal	  Cardiovascular: Normal S1 S2,RRR, No JVD, No murmurs  Respiratory: Lungs clear to auscultation	  Gastrointestinal:  Soft, Non-tender, + BS	  Extremities: Normal range of motion, No clubbing, cyanosis or edema      Home Medications:      MEDICATIONS  (STANDING):  heparin   Injectable 5000 Unit(s) SubCutaneous every 8 hours  influenza  Vaccine (HIGH DOSE) 0.5 milliLiter(s) IntraMuscular once  pantoprazole    Tablet 40 milliGRAM(s) Oral before breakfast  piperacillin/tazobactam IVPB.. 3.375 Gram(s) IV Intermittent every 8 hours      TELEMETRY: 	  NSR/ST 90s-100s  ECG:  	  RADIOLOGY:   DIAGNOSTIC TESTING:  [ ] Echocardiogram:  [ ]  Catheterization:  [ ] Stress Test:    OTHER: 	    LABS:	 	    Troponin T, High Sensitivity Result: <6 ng/L [0 - 51] (11-22 @ 01:50)  CKMB Units: 1.2 ng/mL [0.0 - 3.8] (11-21 @ 19:32)  Troponin T, High Sensitivity Result: <6 ng/L [0 - 51] (11-21 @ 19:32)  Troponin T, High Sensitivity Result: <6 ng/L [0 - 51] (11-21 @ 11:47)                          13.1   7.49  )-----------( 314      ( 26 Nov 2024 07:12 )             39.1     11-26    136  |  100  |  14  ----------------------------<  90  3.6   |  20[L]  |  0.62    Ca    9.5      26 Nov 2024 07:12  Phos  3.6     11-26  Mg     2.4     11-26      PT/INR - ( 26 Nov 2024 07:12 )   PT: 11.8 sec;   INR: 1.03 ratio         PTT - ( 26 Nov 2024 07:12 )  PTT:30.0 sec

## 2024-11-27 ENCOUNTER — RESULT REVIEW (OUTPATIENT)
Age: 64
End: 2024-11-27

## 2024-11-27 LAB
ANION GAP SERPL CALC-SCNC: 13 MMOL/L — SIGNIFICANT CHANGE UP (ref 5–17)
APTT BLD: 32.3 SEC — SIGNIFICANT CHANGE UP (ref 24.5–35.6)
BUN SERPL-MCNC: 13 MG/DL — SIGNIFICANT CHANGE UP (ref 7–23)
CALCIUM SERPL-MCNC: 9.3 MG/DL — SIGNIFICANT CHANGE UP (ref 8.4–10.5)
CHLORIDE SERPL-SCNC: 103 MMOL/L — SIGNIFICANT CHANGE UP (ref 96–108)
CO2 SERPL-SCNC: 24 MMOL/L — SIGNIFICANT CHANGE UP (ref 22–31)
CREAT SERPL-MCNC: 0.78 MG/DL — SIGNIFICANT CHANGE UP (ref 0.5–1.3)
EGFR: 84 ML/MIN/1.73M2 — SIGNIFICANT CHANGE UP
GLUCOSE BLDC GLUCOMTR-MCNC: 103 MG/DL — HIGH (ref 70–99)
GLUCOSE BLDC GLUCOMTR-MCNC: 104 MG/DL — HIGH (ref 70–99)
GLUCOSE SERPL-MCNC: 97 MG/DL — SIGNIFICANT CHANGE UP (ref 70–99)
HCT VFR BLD CALC: 38.6 % — SIGNIFICANT CHANGE UP (ref 34.5–45)
HGB BLD-MCNC: 13.1 G/DL — SIGNIFICANT CHANGE UP (ref 11.5–15.5)
INR BLD: 0.98 RATIO — SIGNIFICANT CHANGE UP (ref 0.85–1.16)
MAGNESIUM SERPL-MCNC: 2.4 MG/DL — SIGNIFICANT CHANGE UP (ref 1.6–2.6)
MCHC RBC-ENTMCNC: 31.1 PG — SIGNIFICANT CHANGE UP (ref 27–34)
MCHC RBC-ENTMCNC: 33.9 G/DL — SIGNIFICANT CHANGE UP (ref 32–36)
MCV RBC AUTO: 91.7 FL — SIGNIFICANT CHANGE UP (ref 80–100)
NRBC # BLD: 0 /100 WBCS — SIGNIFICANT CHANGE UP (ref 0–0)
PHOSPHATE SERPL-MCNC: 3.8 MG/DL — SIGNIFICANT CHANGE UP (ref 2.5–4.5)
PLATELET # BLD AUTO: 334 K/UL — SIGNIFICANT CHANGE UP (ref 150–400)
POTASSIUM SERPL-MCNC: 3.8 MMOL/L — SIGNIFICANT CHANGE UP (ref 3.5–5.3)
POTASSIUM SERPL-SCNC: 3.8 MMOL/L — SIGNIFICANT CHANGE UP (ref 3.5–5.3)
PROTHROM AB SERPL-ACNC: 11.3 SEC — SIGNIFICANT CHANGE UP (ref 9.9–13.4)
RBC # BLD: 4.21 M/UL — SIGNIFICANT CHANGE UP (ref 3.8–5.2)
RBC # FLD: 12.4 % — SIGNIFICANT CHANGE UP (ref 10.3–14.5)
SODIUM SERPL-SCNC: 140 MMOL/L — SIGNIFICANT CHANGE UP (ref 135–145)
WBC # BLD: 7.12 K/UL — SIGNIFICANT CHANGE UP (ref 3.8–10.5)
WBC # FLD AUTO: 7.12 K/UL — SIGNIFICANT CHANGE UP (ref 3.8–10.5)

## 2024-11-27 PROCEDURE — 47562 LAPAROSCOPIC CHOLECYSTECTOMY: CPT | Mod: 22

## 2024-11-27 PROCEDURE — 88304 TISSUE EXAM BY PATHOLOGIST: CPT | Mod: 26

## 2024-11-27 DEVICE — SURGICEL POWDER 3 GRAMS: Type: IMPLANTABLE DEVICE | Status: FUNCTIONAL

## 2024-11-27 RX ORDER — FENTANYL 12 UG/H
25 PATCH, EXTENDED RELEASE TRANSDERMAL
Refills: 0 | Status: DISCONTINUED | OUTPATIENT
Start: 2024-11-27 | End: 2024-11-27

## 2024-11-27 RX ORDER — 0.9 % SODIUM CHLORIDE 0.9 %
1000 INTRAVENOUS SOLUTION INTRAVENOUS
Refills: 0 | Status: DISCONTINUED | OUTPATIENT
Start: 2024-11-27 | End: 2024-11-29

## 2024-11-27 RX ORDER — FENTANYL 12 UG/H
50 PATCH, EXTENDED RELEASE TRANSDERMAL
Refills: 0 | Status: DISCONTINUED | OUTPATIENT
Start: 2024-11-27 | End: 2024-11-27

## 2024-11-27 RX ORDER — OXYCODONE HYDROCHLORIDE 30 MG/1
5 TABLET ORAL EVERY 4 HOURS
Refills: 0 | Status: DISCONTINUED | OUTPATIENT
Start: 2024-11-27 | End: 2024-11-29

## 2024-11-27 RX ORDER — ONDANSETRON HYDROCHLORIDE 4 MG/1
4 TABLET, FILM COATED ORAL ONCE
Refills: 0 | Status: DISCONTINUED | OUTPATIENT
Start: 2024-11-27 | End: 2024-11-27

## 2024-11-27 RX ORDER — PIPERACILLIN SODIUM AND TAZOBACTAM SODIUM 4; .5 G/20ML; G/20ML
3.38 INJECTION, POWDER, LYOPHILIZED, FOR SOLUTION INTRAVENOUS EVERY 8 HOURS
Refills: 0 | Status: DISCONTINUED | OUTPATIENT
Start: 2024-11-27 | End: 2024-11-29

## 2024-11-27 RX ORDER — ENOXAPARIN SODIUM 30 MG/.3ML
40 INJECTION SUBCUTANEOUS EVERY 24 HOURS
Refills: 0 | Status: DISCONTINUED | OUTPATIENT
Start: 2024-11-27 | End: 2024-11-29

## 2024-11-27 RX ORDER — ACETAMINOPHEN 500MG 500 MG/1
1000 TABLET, COATED ORAL EVERY 6 HOURS
Refills: 0 | Status: DISCONTINUED | OUTPATIENT
Start: 2024-11-27 | End: 2024-11-29

## 2024-11-27 RX ORDER — OXYCODONE HYDROCHLORIDE 30 MG/1
2.5 TABLET ORAL EVERY 4 HOURS
Refills: 0 | Status: DISCONTINUED | OUTPATIENT
Start: 2024-11-27 | End: 2024-11-29

## 2024-11-27 RX ADMIN — PANTOPRAZOLE SODIUM 40 MILLIGRAM(S): 40 TABLET, DELAYED RELEASE ORAL at 05:12

## 2024-11-27 RX ADMIN — PIPERACILLIN SODIUM AND TAZOBACTAM SODIUM 25 GRAM(S): 4; .5 INJECTION, POWDER, LYOPHILIZED, FOR SOLUTION INTRAVENOUS at 05:12

## 2024-11-27 RX ADMIN — Medication 75 MILLILITER(S): at 21:25

## 2024-11-27 RX ADMIN — FENTANYL 25 MICROGRAM(S): 12 PATCH, EXTENDED RELEASE TRANSDERMAL at 17:35

## 2024-11-27 RX ADMIN — FENTANYL 25 MICROGRAM(S): 12 PATCH, EXTENDED RELEASE TRANSDERMAL at 18:05

## 2024-11-27 RX ADMIN — PIPERACILLIN SODIUM AND TAZOBACTAM SODIUM 25 GRAM(S): 4; .5 INJECTION, POWDER, LYOPHILIZED, FOR SOLUTION INTRAVENOUS at 23:00

## 2024-11-27 NOTE — PROGRESS NOTE ADULT - SUBJECTIVE AND OBJECTIVE BOX
SURGERY DAILY PROGRESS NOTE    24 Hour/Overnight Events: No acute events overnight    SUBJECTIVE: Patient seen and evaluated on AM rounds. Pt is resting comfortably in bed with no acute complaints.   ------------------------------------------------------------------------------------------------------------  OBJECTIVE:  Vital Signs Last 24 Hrs  T(C): 36.5 (27 Nov 2024 08:15), Max: 37.2 (26 Nov 2024 20:20)  T(F): 97.7 (27 Nov 2024 04:22), Max: 99 (26 Nov 2024 20:20)  HR: 85 (27 Nov 2024 08:15) (85 - 101)  BP: 120/62 (27 Nov 2024 08:15) (120/62 - 124/85)  BP(mean): --  RR: 18 (27 Nov 2024 08:15) (18 - 18)  SpO2: 95% (27 Nov 2024 08:15) (94% - 95%)    Parameters below as of 27 Nov 2024 04:22  Patient On (Oxygen Delivery Method): room air      I&O's Detail    26 Nov 2024 07:01  -  27 Nov 2024 07:00  --------------------------------------------------------  IN:    Oral Fluid: 480 mL  Total IN: 480 mL    OUT:  Total OUT: 0 mL    Total NET: 480 mL          LABS:                        13.1   7.12  )-----------( 334      ( 27 Nov 2024 06:56 )             38.6     11-27    140  |  103  |  13  ----------------------------<  97  3.8   |  24  |  0.78    Ca    9.3      27 Nov 2024 06:58  Phos  3.8     11-27  Mg     2.4     11-27        PT/INR - ( 27 Nov 2024 06:57 )   PT: 11.3 sec;   INR: 0.98 ratio         PTT - ( 27 Nov 2024 06:57 )  PTT:32.3 sec  Urinalysis Basic - ( 27 Nov 2024 06:58 )    Color: x / Appearance: x / SG: x / pH: x  Gluc: 97 mg/dL / Ketone: x  / Bili: x / Urobili: x   Blood: x / Protein: x / Nitrite: x   Leuk Esterase: x / RBC: x / WBC x   Sq Epi: x / Non Sq Epi: x / Bacteria: x      Physical Exam:  General: NAD, resting comfortably in bed  Respiratory: Nonlabored respirations  Cardio: pulse present  Abdomen: softly distended, NTTP  Vascular: extremities are warm and well perfused.     PLAN:   -lap santo, possible open, possible ioc today   -pain control

## 2024-11-27 NOTE — PROGRESS NOTE ADULT - SUBJECTIVE AND OBJECTIVE BOX
Patient is a 64y Female     Patient is a 64y old  Female who presents with a chief complaint of cholecystisits (26 Nov 2024 06:45)      HPI:  64F no PMH here with epigastric pain radiating to the back since 0700 today. Pain is constant and worse w eating assoc w nausea and few episodes of emesis. NO fevers. No hematemesis. No diarrhea, No fevers. Not on any meds, No EtOH. No prior abd surg. (21 Nov 2024 17:49)      PAST MEDICAL & SURGICAL HISTORY:  Fall, subsequent encounter      No significant past surgical history          MEDICATIONS  (STANDING):  heparin   Injectable 5000 Unit(s) SubCutaneous every 8 hours  influenza  Vaccine (HIGH DOSE) 0.5 milliLiter(s) IntraMuscular once  pantoprazole    Tablet 40 milliGRAM(s) Oral before breakfast  piperacillin/tazobactam IVPB.. 3.375 Gram(s) IV Intermittent every 8 hours      Allergies    No Known Allergies    Intolerances        SOCIAL HISTORY:  Denies ETOh,Smoking,     FAMILY HISTORY:      REVIEW OF SYSTEMS:    CONSTITUTIONAL: No weakness, fevers or chills  EYES/ENT: No visual changes;  No vertigo or throat pain   NECK: No pain or stiffness  RESPIRATORY: No cough, wheezing, hemoptysis; No shortness of breath  CARDIOVASCULAR: No chest pain or palpitations  GASTROINTESTINAL: No abdominal or epigastric pain. No nausea, vomiting, or hematemesis; No diarrhea or constipation. No melena or hematochezia.  GENITOURINARY: No dysuria, frequency or hematuria  NEUROLOGICAL: No numbness or weakness  SKIN: No itching, burning, rashes, or lesions   All other review of systems is negative unless indicated above.    VITAL:  T(C): , Max: 37.2 (11-26-24 @ 20:20)  T(F): , Max: 99 (11-26-24 @ 20:20)  HR: 85 (11-27-24 @ 04:22)  BP: 120/62 (11-27-24 @ 04:22)  BP(mean): --  RR: 18 (11-27-24 @ 04:22)  SpO2: 95% (11-27-24 @ 04:22)  Wt(kg): --    I and O's:    11-25 @ 07:01  -  11-26 @ 07:00  --------------------------------------------------------  IN: 100 mL / OUT: 0 mL / NET: 100 mL    11-26 @ 07:01  -  11-27 @ 07:00  --------------------------------------------------------  IN: 480 mL / OUT: 0 mL / NET: 480 mL          PHYSICAL EXAM:    Constitutional: NAD  HEENT: PERRLA,   Neck: No JVD  Respiratory: CTA B/L  Cardiovascular: S1 and S2  Gastrointestinal: BS+, soft, NT/ND  Extremities: No peripheral edema  Neurological: A/O x 3, no focal deficits  Psychiatric: Normal mood, normal affect  : No Kidd  Skin: No rashes  Access: Not applicable  Back: No CVA tenderness    LABS:                        13.1   7.12  )-----------( 334      ( 27 Nov 2024 06:56 )             38.6     11-26    136  |  100  |  14  ----------------------------<  90  3.6   |  20[L]  |  0.62    Ca    9.5      26 Nov 2024 07:12  Phos  3.6     11-26  Mg     2.4     11-26            RADIOLOGY & ADDITIONAL STUDIES:

## 2024-11-27 NOTE — PROGRESS NOTE ADULT - ATTENDING COMMENTS
65 yo f, with findings consistent with acute calculous cholecystitis.  - Risk, benefit, and alternatives to surgery discussed with patient and her daughter at bedside. Plan for laparoscopic cholecystectomy. Consent in chart.
seen and examined 11-26-24 @ 1010    2020 @ [facility] - laparoscopic oophorectomy for benign ovarian cysts    soft / mild RUQ tenderness / ND  no jaundice    WBC = 7  hgb - 13.1 g/dL  LFTs (11/23) - wnl      MRCP (11/22) - sludge and possible cholelithiasis. CBD = 7 mm without evidence of choledocholithiasis.    CT abd/pelvis w/ contrast (11/23) - distended gallbladder with wall edema and pericholecystic fluid. new 2 cm outpouchings along the anterior portion of the bladder neck concerning for contained perforation. (I reviewed the radiologic images)    HIDA (11/25) - gallbladder not visualized depside morphine administration      cholelithiasis with acute cholecystitis and possible perforation  -The risks (bleeding, infection, bile duct injury, retained bile duct stone or bile leak requiring endoscopy), benefits and alternatives of laparoscopic (possible open) cholecystectomy were discussed with the patient. She desires surgery and will be booked for tomorrow.

## 2024-11-27 NOTE — PROGRESS NOTE ADULT - SUBJECTIVE AND OBJECTIVE BOX
Patient is a 64y old  Female who presents with a chief complaint of dyspepsia (27 Nov 2024 07:14)      SUBJECTIVE / OVERNIGHT EVENTS: Comfortable without new complaints.   Review of Systems  chest pain no  palpitations no  sob no  nausea no  headache no    MEDICATIONS  (STANDING):  enoxaparin Injectable 40 milliGRAM(s) SubCutaneous every 24 hours  influenza  Vaccine (HIGH DOSE) 0.5 milliLiter(s) IntraMuscular once  lactated ringers. 1000 milliLiter(s) (75 mL/Hr) IV Continuous <Continuous>  piperacillin/tazobactam IVPB.. 3.375 Gram(s) IV Intermittent every 8 hours    MEDICATIONS  (PRN):  acetaminophen     Tablet .. 1000 milliGRAM(s) Oral every 6 hours PRN Mild Pain (1 - 3)  oxyCODONE    IR 2.5 milliGRAM(s) Oral every 4 hours PRN Moderate Pain (4 - 6)  oxyCODONE    IR 5 milliGRAM(s) Oral every 4 hours PRN Severe Pain (7 - 10)      Vital Signs Last 24 Hrs  T(C): 36.6 (27 Nov 2024 20:10), Max: 37.1 (27 Nov 2024 11:28)  T(F): 97.8 (27 Nov 2024 20:10), Max: 98.8 (27 Nov 2024 11:28)  HR: 91 (27 Nov 2024 20:10) (85 - 102)  BP: 102/67 (27 Nov 2024 20:10) (102/67 - 123/67)  BP(mean): 85 (27 Nov 2024 18:30) (83 - 90)  RR: 18 (27 Nov 2024 20:10) (14 - 26)  SpO2: 93% (27 Nov 2024 20:10) (92% - 95%)    Parameters below as of 27 Nov 2024 20:10  Patient On (Oxygen Delivery Method): nasal cannula  O2 Flow (L/min): 2      PHYSICAL EXAM:  GENERAL: NAD, well-developed  HEAD:  Atraumatic, Normocephalic  EYES: EOMI, PERRLA, conjunctiva and sclera clear  NECK: Supple, No JVD  CHEST/LUNG: Clear to auscultation bilaterally; No wheeze  HEART: Regular rate and rhythm; No murmurs, rubs, or gallops  ABDOMEN: Soft, RUQ tender, Nondistended; Bowel sounds present  EXTREMITIES:  2+ Peripheral Pulses, No clubbing, cyanosis, or edema  PSYCH: AAOx3  NEUROLOGY: non-focal  SKIN: No rashes or lesions    LABS:                        13.1   7.12  )-----------( 334      ( 27 Nov 2024 06:56 )             38.6     11-27    140  |  103  |  13  ----------------------------<  97  3.8   |  24  |  0.78    Ca    9.3      27 Nov 2024 06:58  Phos  3.8     11-27  Mg     2.4     11-27      PT/INR - ( 27 Nov 2024 06:57 )   PT: 11.3 sec;   INR: 0.98 ratio         PTT - ( 27 Nov 2024 06:57 )  PTT:32.3 sec      Urinalysis Basic - ( 27 Nov 2024 06:58 )    Color: x / Appearance: x / SG: x / pH: x  Gluc: 97 mg/dL / Ketone: x  / Bili: x / Urobili: x   Blood: x / Protein: x / Nitrite: x   Leuk Esterase: x / RBC: x / WBC x   Sq Epi: x / Non Sq Epi: x / Bacteria: x          RADIOLOGY & ADDITIONAL TESTS:    Imaging Personally Reviewed:    Consultant(s) Notes Reviewed:      Care Discussed with Consultants/Other Providers:

## 2024-11-27 NOTE — PROGRESS NOTE ADULT - SUBJECTIVE AND OBJECTIVE BOX
CARDIOLOGY FOLLOW UP - Dr. Chanel  Date of Service: 11-27-24 @ 13:18    CC: no events    Review of Systems:  Constitutional: No fever, weight loss, or fatigue  Respiratory: No cough, wheezing, or hemoptysis, no shortness of breath  Cardiovascular: No chest pain, palpitations, passing out, dizziness, or leg swelling  Gastrointestinal: No abd or epigastric pain. No nausea, vomiting, or hematemesis; no diarrhea or consiptaiton, no melena or hematochezia  Vascular: No edema     TELEMETRY:    PHYSICAL EXAM:  T(C): 37.1 (11-27-24 @ 11:28), Max: 37.2 (11-26-24 @ 20:20)  HR: 91 (11-27-24 @ 11:28) (85 - 101)  BP: 106/70 (11-27-24 @ 11:28) (106/70 - 124/78)  RR: 18 (11-27-24 @ 11:28) (18 - 18)  SpO2: 93% (11-27-24 @ 11:28) (93% - 95%)  Wt(kg): --  I&O's Summary    26 Nov 2024 07:01  -  27 Nov 2024 07:00  --------------------------------------------------------  IN: 480 mL / OUT: 0 mL / NET: 480 mL        Appearance: Normal	  Cardiovascular: Normal S1 S2,RRR, No JVD, No murmurs  Respiratory: Lungs clear to auscultation	  Gastrointestinal:  Soft, Non-tender, + BS	  Extremities: Normal range of motion, No clubbing, cyanosis or edema  Vascular: Peripheral pulses palpable 2+ bilaterally       Home Medications:        MEDICATIONS  (STANDING):  heparin   Injectable 5000 Unit(s) SubCutaneous every 8 hours  influenza  Vaccine (HIGH DOSE) 0.5 milliLiter(s) IntraMuscular once  pantoprazole    Tablet 40 milliGRAM(s) Oral before breakfast  piperacillin/tazobactam IVPB.. 3.375 Gram(s) IV Intermittent every 8 hours        EKG:  RADIOLOGY:  DIAGNOSTIC TESTING:  [ ] Echocardiogram:  [ ] Catherterization:  [ ] Stress Test:  OTHER:     LABS:	 	                          13.1   7.12  )-----------( 334      ( 27 Nov 2024 06:56 )             38.6     11-27    140  |  103  |  13  ----------------------------<  97  3.8   |  24  |  0.78    Ca    9.3      27 Nov 2024 06:58  Phos  3.8     11-27  Mg     2.4     11-27        PT/INR - ( 27 Nov 2024 06:57 )   PT: 11.3 sec;   INR: 0.98 ratio         PTT - ( 27 Nov 2024 06:57 )  PTT:32.3 sec    CARDIAC MARKERS:

## 2024-11-27 NOTE — BRIEF OPERATIVE NOTE - OPERATION/FINDINGS
laparoscopic fenestrated subtotal cholecystectomy for perforated, purulent cholecystitis. Significant inflammation of gallbladder w/ dense adhesions, unable to dissect out structures safely, decision to perform subtotal cholecystectomy, gallbladder opened at infundibulum and taken off liver, liver bed/back of gallbladder fulgurated, hemostasis at end of case

## 2024-11-27 NOTE — PROGRESS NOTE ADULT - ASSESSMENT
64 f with    Acute cholecystitis/ Abdominal pain  - CT abdomen noted  - Zosyn  - MRCP noted  - pain control  - GI follow DR Loya   - Surgical reevaluation noted  - clears   - CTa  no  PE   - OR pending for cholecystectomy.     EKG changes  - cardiac enzymes noted   - Echo no significant findings  - Cardiology follow Dr Chanel     DVT prophylaxis.    No acute medical condition identified to postpone planned surgical intervention.     d/w patient, sisters, ACP    Rad Ramos MD phone [phone]

## 2024-11-27 NOTE — PROGRESS NOTE ADULT - ASSESSMENT
A/P  65 yo F no PMH here with epigastric and abdominal pain radiating to the back since 0700 today. Pain is constant and worse w eating assoc w nausea and few episodes of emesis. Patient reports SOB from the abdominal pain, not being able to take a full breath in.      #Epigastric/Abdominal pain, N/V  -pleuritic pain, referred from abdomen  -HST x3 negative, CK, CKMB normal   -EKG shows NSR HR 86 with no acute ischemic changes when compared to prior EKG August 2023  -TTE normal lv fxn, EF 74%  -CXR shows clear lungs  -CT A/P shows Mild extrahepatic biliary ductal dilatation with normal distal tapering at the ampulla, unchanged from 8/21/2023. Note is made of an adjacent periampullary duodenal diverticulum, possibly the cause of the mildly dilated CBD  -MR MRCP noted   -CT Angio Chest shows no PE. Findings suggestive of acute cholecystitis with possible contained perforations or abscesses, are unchanged in size and better evaluated on prior CT abdomen and pelvis from 11/23/2024.  -HIDA scan findings compatible with acute cholecystitis  -GI/Surgery f/u   -per IR, plan for percutaneous cholecystostomy tube for Friday 11/29  -hydrate, diet per gi        dvt ppx        35 minutes spent on total encounter; more than 50% of the visit was spent counseling and/or coordinating care by the attending physician.

## 2024-11-27 NOTE — PRE-OP CHECKLIST - VERIFY SURGICAL SITE/SIDE WITH PATIENT
NEPHROLOGY NOTE:    12/08/19       Dalila Santoro     75 year old   1944   [id number]   fatigue (Patient presents with fatigue. Didn't get out of bed yesterday or this morning. She started a new chemo treatment wedneseday. paclitaxel)         Renal function remains stable  Urine output is good  HGB trending down 6.6, receiving 2 units PRBC today  Will give Bumex IV drip 0.5mghr with 25% albumin 2 dose, trying for 1-2L negative balance 24 hrs     Subjective      Son and  at the bedside  Awake, no sedation      Objective     I/O's    Intake/Output Summary (Last 24 hours) at 12/8/2019 0907  Last data filed at 12/8/2019 0800  Gross per 24 hour   Intake 2401.66 ml   Output 408 ml   Net 1993.66 ml       Last Recorded Vitals  Vitals Last Value 24 Hour Range  Temperature 97.5 °F (36.4 °C) Temp  Min: 96.3 °F (35.7 °C)  Max: 98.4 °F (36.9 °C)  Pulse 130 Pulse  Min: 77  Max: 134  Respiratory 18 Resp  Min: 14  Max: 31  Blood Pressure 137/72 BP  Min: 110/56  Max: 161/57  Arterial /69 Arterial Line BP  Min: 100/69   Min taken time: 12/08/19 0800  Max: 158/59   Max taken time: 12/07/19 1700  Pulse Oximetry 100 % SpO2  Min: 98 %  Max: 100 %  Body mass index is 33.91 kg/m².    Physical Exam  Vitals signs reviewed.   Constitutional:       Appearance: She is ill-appearing.   HENT:      Head: Normocephalic and atraumatic.   Cardiovascular:      Rate and Rhythm: Rhythm irregular.      Heart sounds: Murmur present.   Pulmonary:      Comments: Intubated, course bilaterally  Abdominal:      General: There is distension.      Palpations: Abdomen is soft.   Musculoskeletal:         General: Swelling present.      Right lower leg: Edema present.      Left lower leg: Edema present.      Comments: anasarca   Skin:     General: Skin is warm and dry.      Coloration: Skin is pale.   Neurological:      Mental Status: She is alert. Mental status is at baseline.             Labs   Lab Results   Component Value Date    SODIUM 137  12/08/2019    SODIUM 140 12/07/2019    SODIUM 140 12/07/2019    POTASSIUM 3.7 12/08/2019    POTASSIUM 4.1 12/07/2019    POTASSIUM 3.6 12/07/2019    CHLORIDE 105 12/08/2019    CHLORIDE 104 12/07/2019    CHLORIDE 105 12/07/2019    CO2 24 12/08/2019    CO2 22 12/07/2019    CO2 23 12/07/2019    ANIONGAP 12 12/08/2019    ANIONGAP 18 12/07/2019    ANIONGAP 16 12/07/2019    BUN 32 (H) 12/08/2019    BUN 31 (H) 12/07/2019    BUN 29 (H) 12/07/2019    CREATININE 2.18 (H) 12/08/2019    CREATININE 2.06 (H) 12/07/2019    CREATININE 2.02 (H) 12/07/2019    CALCIUM 8.1 (L) 12/08/2019    CALCIUM 8.2 (L) 12/07/2019    CALCIUM 8.0 (L) 12/07/2019    GLUCOSE 85 12/08/2019    GLUCOSE 132 (H) 12/07/2019    GLUCOSE 100 (H) 12/07/2019    WBC 10.4 12/08/2019    WBC 10.5 12/07/2019    WBC 10.7 12/07/2019    HCT 21.0 (L) 12/08/2019    HCT 20.3 (L) 12/08/2019    HCT 25.4 (L) 12/07/2019     Lab Results   Component Value Date    HGB 6.8 (LL) 12/08/2019    HGB 6.6 (LL) 12/08/2019    HGB 8.0 (L) 12/07/2019    PLT 66 (L) 12/08/2019    PLT 89 (L) 12/07/2019    PLT 77 (L) 12/07/2019    INR 1.4 12/08/2019    INR 1.7 12/07/2019    INR 1.8 12/07/2019    PTT 42 (H) 12/07/2019    PTT 41 (H) 12/07/2019    PTT 34 (H) 09/18/2019    PTT PTT  Therapeutic Range:  45-70 seconds. 09/18/2019    PTT NOT APPLICABLE 09/18/2019    MG 1.8 12/08/2019    MG 1.8 12/07/2019    MG 1.3 (L) 12/07/2019    PHOS 3.5 12/08/2019    PHOS 4.1 12/07/2019    PHOS 3.2 12/07/2019    ALKPT 61 12/08/2019    ALKPT 73 12/07/2019    ALKPT 82 12/07/2019    VB12 737 11/24/2019    VB12 197 (L) 05/03/2019    VB12 197 (L) 05/03/2019    BILIRUBIN 1.2 (H) 12/08/2019    BILIRUBIN 1.1 (H) 12/07/2019    BILIRUBIN 0.8 12/07/2019    AST 28 12/08/2019    AST 26 12/07/2019    AST 31 12/07/2019    GPT 17 12/08/2019    GPT 16 12/07/2019    GPT 17 12/07/2019    ALBUMIN 2.4 (L) 12/08/2019    ALBUMIN 2.6 (L) 12/07/2019    ALBUMIN 1.8 (L) 12/07/2019    GFRA 25 12/08/2019    GFRA 26 12/07/2019    GFRA 27  2019    GFRNA 21 2019    GFRNA 23 2019    GFRNA 23 2019    LACTA 2.4 (HH) 2019    LACTA 3.1 (HH) 2019    LACTA 4.0 (HH) 2019    PST 14 (L) 2019    PST 57 (H) 10/23/2019    PST 21 2019    FERR 707 (H) 2019    FERR 874 (H) 10/23/2019    FERR 564 (H) 2019       Urine Panel  Lab Results   Component Value Date    5UNITR NEGATIVE 10/22/2019    UKET NEGATIVE 10/22/2019    USPG 1.020 10/22/2019    UPROT 30 (A) 10/22/2019    UWBC MODERATE (A) 10/22/2019    URBC NEGATIVE 10/22/2019    UBILI NEGATIVE 10/22/2019    UPH 5.5 10/22/2019    UBACTR MODERATE (A) 10/22/2019       Microbiology Results  (Last 10 results in the past 7 days)    Specimen   Gram Smear   Culture Result   Status      19  0627   19  0627  19  0627     BLOOD LW   NO GROWTH <24 HRS. PENDING    19  0618   19  0618  19  0618     BLOOD LH   NO GROWTH <24 HRS. PENDING    19  1840   19  1840  19  1840     BLOOD LT AC   NO GROWTH 2 DAYS. PENDING    19  1837   19  1837  19  183     BLOOD LT HAND   NO GROWTH 2 DAYS. PENDING          Imaging    EXAMINATION: PORTABLE CHEST ONE VIEW     INDICATION: Nasogastric tube placement     COMPARISON: 2019     IMPRESSION:  FINDINGS AND IMPRESSION:     Cardiomegaly.  Mild congestive changes.  Mild bilateral pleural effusions.  Relatively unchanged.  Well-positioned endotracheal tube and right chest port.  New nasogastric tube with tip coursing towards the gastric fundus.  No pneumothorax.  No acute   osseous process.     ______________________________________________    Transthoracic Echocardiogram (TTE)     Patient: Dalila Santoro   Study Date/Time:          Dec 7 2019 8:00AM  MRN:     [id number]        FIN#:                     [id number]  :     1944     Ht/Wt:                    162.6cm 74.4kg  Age:     75             BSA/BMI:                  1.8m^2 28.2kg/m^2  Gender:  F               Baseline BP:              116 / 68     Referring Physician:  MD Edwin Keenan MD     Attending Physician:  Edwin Scott MD  Diagnostic Physician: Phill Maxwell MD  Sonographer:          Sophie Velez     --------------------------------------------------------------------------  INDICATIONS:   Cardiac Arrest.  Pericardial effusion.     --------------------------------------------------------------------------  STUDY CONCLUSIONS  SUMMARY:     1. Left ventricle: The cavity size is normal. Wall thickness is normal.     Systolic function is normal. The estimated ejection fraction is 60-65%.  2. Pericardium, extracardiac: A moderate pericardial effusion is     identified circumferential to the heart, with an appearance consistent     with hemopericardium. The fluid has a fibrinous appearance. There is no     evidence of hemodynamic compromise. The posterior pericardial effusion     has significantly increased changed since the study of 12/02/2019.     There is a moderate-sized left pleural effusion.        INPATIENT MEDICATIONS  Scheduled Meds  • petrolatum (white)-mineral oil  1 application Both Eyes 6 times per day   • famotidine  20 mg Intravenous Daily   • chlorhexidine gluconate  15 mL Swish & Spit 2 times per day   • amLODIPine  5 mg Oral Daily   • linezolid  600 mg Intravenous 2 times per day   • ofloxacin  3 drop Both Eyes 4x Daily   • pantoprazole  40 mg Oral QAM AC   • sodium chloride (PF)  2 mL Intracatheter 2 times per day       Continuous Infusions  • fentaNYL (SUBLIMAZE) 2,500 mcg/250 mL in sodium chloride 0.9 % infusion 50 mcg/hr (12/08/19 0700)   • propofol (DIPRIVAN) infusion Stopped (12/07/19 7118)   • AMIODarone (CORDARONE) 450 mg/250 mL dextrose 5% infusion 0.504 mg/min (12/08/19 0700)   • sodium chloride 0.9% infusion Stopped (12/08/19 0557)       PRN Meds  fentaNYL (SUBLIMAZE) infusion **AND** fentaNYL, propofol (DIPRIVAN) infusion **AND** Triglyceride, acetaminophen,  chlorhexidine gluconate **AND** chlorhexidine gluconate, [COMPLETED] lactated ringers **FOLLOWED BY** lactated ringers, fentaNYL, alteplase, ondansetron, influenza virus quadrivalent vaccine inactivated injection, sodium chloride, sodium chloride     .  ASSESSMENT and PLAN      Acute kidney injury.  Creatinine still rising a little bit.  It is up to 2.27 admission time and a month ago it was 1.7 today it is 2.32.  So there is some worsening of renal function with diuresis.     Advanced chronic kidney disease stage IV most likely to ischemic nephropathy hypertensive nephrosclerosis. Stable     Acute kidney injury due to volume shift and underlying infectious etiology.  Seems to be improving and currently stable with GFR of 21.     Azotemia     Anemia multifactorial such as iron deficiency and relative erythropoietin deficiency and metastatic cancer with with chemotherapy can also influence anemia.     Colon cancer history of        History of fallopian tube cancer     Macrocytosis such as MCV is over 100 suggestive of reticulocytosis or B12 deficiency or folic acid deficiency     Severe hypoalbuminemia causing hypocalcemia     Possible second hyperparathyroidism     Possible vitamin D deficiency     Pneumonia    Bilateral pleural effusions    Hypoxia     Fatigue and weakness most likely due to severe anemia and age and underlying illness     Pneumonia is suspected currently on 2 antibiotics.  Chest x-ray looks worse today     Moderate protein calorie malnourishment     Edema     History of congestive heart failure diastolic left ventricular.     Possible proteinuria      Cardiac arrest 12/7/19    PLAN:    Extubation today  IV drip Bumex 0.5mg/hr with 25% albumin 2 dose  Continue to monitor renal panel  Agree with 2 units of PRBC today  Procrit per Heme/Onc  Antibiotics per ID    All plans and assessments discussed with DANNY Pedroza    done

## 2024-11-28 LAB
ALBUMIN SERPL ELPH-MCNC: 3.5 G/DL — SIGNIFICANT CHANGE UP (ref 3.3–5)
ALP SERPL-CCNC: 118 U/L — SIGNIFICANT CHANGE UP (ref 40–120)
ALT FLD-CCNC: 82 U/L — HIGH (ref 10–45)
ANION GAP SERPL CALC-SCNC: 16 MMOL/L — SIGNIFICANT CHANGE UP (ref 5–17)
AST SERPL-CCNC: 67 U/L — HIGH (ref 10–40)
BILIRUB SERPL-MCNC: 0.4 MG/DL — SIGNIFICANT CHANGE UP (ref 0.2–1.2)
BUN SERPL-MCNC: 13 MG/DL — SIGNIFICANT CHANGE UP (ref 7–23)
CALCIUM SERPL-MCNC: 8.8 MG/DL — SIGNIFICANT CHANGE UP (ref 8.4–10.5)
CHLORIDE SERPL-SCNC: 104 MMOL/L — SIGNIFICANT CHANGE UP (ref 96–108)
CO2 SERPL-SCNC: 18 MMOL/L — LOW (ref 22–31)
CREAT SERPL-MCNC: 0.52 MG/DL — SIGNIFICANT CHANGE UP (ref 0.5–1.3)
EGFR: 102 ML/MIN/1.73M2 — SIGNIFICANT CHANGE UP
GLUCOSE SERPL-MCNC: 101 MG/DL — HIGH (ref 70–99)
HCT VFR BLD CALC: 38.2 % — SIGNIFICANT CHANGE UP (ref 34.5–45)
HGB BLD-MCNC: 12.8 G/DL — SIGNIFICANT CHANGE UP (ref 11.5–15.5)
MAGNESIUM SERPL-MCNC: 2.2 MG/DL — SIGNIFICANT CHANGE UP (ref 1.6–2.6)
MCHC RBC-ENTMCNC: 30 PG — SIGNIFICANT CHANGE UP (ref 27–34)
MCHC RBC-ENTMCNC: 33.5 G/DL — SIGNIFICANT CHANGE UP (ref 32–36)
MCV RBC AUTO: 89.7 FL — SIGNIFICANT CHANGE UP (ref 80–100)
NRBC # BLD: 0 /100 WBCS — SIGNIFICANT CHANGE UP (ref 0–0)
PHOSPHATE SERPL-MCNC: 3.7 MG/DL — SIGNIFICANT CHANGE UP (ref 2.5–4.5)
PLATELET # BLD AUTO: 324 K/UL — SIGNIFICANT CHANGE UP (ref 150–400)
POTASSIUM SERPL-MCNC: 4 MMOL/L — SIGNIFICANT CHANGE UP (ref 3.5–5.3)
POTASSIUM SERPL-SCNC: 4 MMOL/L — SIGNIFICANT CHANGE UP (ref 3.5–5.3)
PROT SERPL-MCNC: 6.9 G/DL — SIGNIFICANT CHANGE UP (ref 6–8.3)
RBC # BLD: 4.26 M/UL — SIGNIFICANT CHANGE UP (ref 3.8–5.2)
RBC # FLD: 12.3 % — SIGNIFICANT CHANGE UP (ref 10.3–14.5)
SODIUM SERPL-SCNC: 138 MMOL/L — SIGNIFICANT CHANGE UP (ref 135–145)
WBC # BLD: 13.09 K/UL — HIGH (ref 3.8–10.5)
WBC # FLD AUTO: 13.09 K/UL — HIGH (ref 3.8–10.5)

## 2024-11-28 RX ADMIN — PIPERACILLIN SODIUM AND TAZOBACTAM SODIUM 25 GRAM(S): 4; .5 INJECTION, POWDER, LYOPHILIZED, FOR SOLUTION INTRAVENOUS at 22:26

## 2024-11-28 RX ADMIN — PIPERACILLIN SODIUM AND TAZOBACTAM SODIUM 25 GRAM(S): 4; .5 INJECTION, POWDER, LYOPHILIZED, FOR SOLUTION INTRAVENOUS at 13:48

## 2024-11-28 RX ADMIN — ENOXAPARIN SODIUM 40 MILLIGRAM(S): 30 INJECTION SUBCUTANEOUS at 13:48

## 2024-11-28 RX ADMIN — Medication 75 MILLILITER(S): at 22:27

## 2024-11-28 RX ADMIN — PIPERACILLIN SODIUM AND TAZOBACTAM SODIUM 25 GRAM(S): 4; .5 INJECTION, POWDER, LYOPHILIZED, FOR SOLUTION INTRAVENOUS at 05:46

## 2024-11-28 NOTE — PROGRESS NOTE ADULT - SUBJECTIVE AND OBJECTIVE BOX
Patient is a 64y Female     Patient is a 64y old  Female who presents with a chief complaint of dyspepsia (27 Nov 2024 07:14)      HPI:  64F no PMH here with epigastric pain radiating to the back since 0700 today. Pain is constant and worse w eating assoc w nausea and few episodes of emesis. NO fevers. No hematemesis. No diarrhea, No fevers. Not on any meds, No EtOH. No prior abd surg. (21 Nov 2024 17:49)      PAST MEDICAL & SURGICAL HISTORY:  Fall, subsequent encounter      No significant past surgical history          MEDICATIONS  (STANDING):  enoxaparin Injectable 40 milliGRAM(s) SubCutaneous every 24 hours  influenza  Vaccine (HIGH DOSE) 0.5 milliLiter(s) IntraMuscular once  lactated ringers. 1000 milliLiter(s) (75 mL/Hr) IV Continuous <Continuous>  piperacillin/tazobactam IVPB.. 3.375 Gram(s) IV Intermittent every 8 hours      Allergies    No Known Allergies    Intolerances        SOCIAL HISTORY:  Denies ETOh,Smoking,     FAMILY HISTORY:      REVIEW OF SYSTEMS:    CONSTITUTIONAL: No weakness, fevers or chills  EYES/ENT: No visual changes;  No vertigo or throat pain   NECK: No pain or stiffness  RESPIRATORY: No cough, wheezing, hemoptysis; No shortness of breath  CARDIOVASCULAR: No chest pain or palpitations  GASTROINTESTINAL: No abdominal or epigastric pain. No nausea, vomiting, or hematemesis; No diarrhea or constipation. No melena or hematochezia.  GENITOURINARY: No dysuria, frequency or hematuria  NEUROLOGICAL: No numbness or weakness  SKIN: No itching, burning, rashes, or lesions   All other review of systems is negative unless indicated above.    VITAL:  T(C): , Max: 37.1 (11-27-24 @ 11:28)  T(F): , Max: 98.8 (11-27-24 @ 11:28)  HR: 85 (11-28-24 @ 05:28)  BP: 114/72 (11-28-24 @ 05:28)  BP(mean): 85 (11-27-24 @ 18:30)  RR: 18 (11-28-24 @ 05:28)  SpO2: 96% (11-28-24 @ 05:28)  Wt(kg): --    I and O's:    11-26 @ 07:01  -  11-27 @ 07:00  --------------------------------------------------------  IN: 480 mL / OUT: 0 mL / NET: 480 mL    11-27 @ 07:01  -  11-28 @ 07:00  --------------------------------------------------------  IN: 925 mL / OUT: 0 mL / NET: 925 mL      Height (cm): 165.1 (11-27 @ 13:44)  Weight (kg): 63.5 (11-27 @ 13:44)  BMI (kg/m2): 23.3 (11-27 @ 13:44)  BSA (m2): 1.7 (11-27 @ 13:44)    PHYSICAL EXAM:    Constitutional: NAD  HEENT: PERRLA,   Neck: No JVD  Respiratory: CTA B/L  Cardiovascular: S1 and S2  Gastrointestinal: BS+, soft, NT/ND  Extremities: No peripheral edema  Neurological: A/O x 3, no focal deficits  Psychiatric: Normal mood, normal affect  : No Kidd  Skin: No rashes  Access: Not applicable  Back: No CVA tenderness    LABS:                        12.8   13.09 )-----------( 324      ( 28 Nov 2024 06:32 )             38.2     11-28    138  |  104  |  13  ----------------------------<  101[H]  4.0   |  18[L]  |  0.52    Ca    8.8      28 Nov 2024 06:32  Phos  3.7     11-28  Mg     2.2     11-28    TPro  6.9  /  Alb  3.5  /  TBili  0.4  /  DBili  x   /  AST  67[H]  /  ALT  82[H]  /  AlkPhos  118  11-28          RADIOLOGY & ADDITIONAL STUDIES:

## 2024-11-28 NOTE — PROGRESS NOTE ADULT - ASSESSMENT
64 f with    s/p partial cholecystectomy for Acute cholecystitis  - Zosyn  - pain control  - GI follow DR Loya   - Postop care  - clears advance as tolerated  - CHIRAG     EKG changes  - cardiac enzymes noted   - Echo no significant findings  - Cardiology follow Dr Chanel     DVT prophylaxis.    d/w patient     Rad Ramos MD phone [phone]

## 2024-11-28 NOTE — PROGRESS NOTE ADULT - SUBJECTIVE AND OBJECTIVE BOX
CARDIOLOGY FOLLOW UP - Dr. Chanel  DATE OF SERVICE: 11/28/24    CC s/p laparoscopic fenestrated subtotal cholecystectomy  no CP or SOB    REVIEW OF SYSTEMS:  CONSTITUTIONAL: No fever, weight loss, or fatigue  RESPIRATORY: No cough, wheezing, chills or hemoptysis; No Shortness of Breath  CARDIOVASCULAR: No chest pain, palpitations, passing out, dizziness  GASTROINTESTINAL: No abdominal or epigastric pain. No nausea, vomiting, or hematemesis; No diarrhea or constipation. No melena or hematochezia.      PHYSICAL EXAM:  T(C): 36.9 (11-28-24 @ 05:28), Max: 37.1 (11-27-24 @ 11:28)  HR: 85 (11-28-24 @ 05:28) (85 - 102)  BP: 114/72 (11-28-24 @ 05:28) (102/67 - 123/67)  RR: 18 (11-28-24 @ 05:28) (14 - 26)  SpO2: 96% (11-28-24 @ 05:28) (92% - 96%)  Wt(kg): --  I&O's Summary    27 Nov 2024 07:01  -  28 Nov 2024 07:00  --------------------------------------------------------  IN: 500 mL / OUT: 0 mL / NET: 500 mL        Appearance: Normal	  Cardiovascular: Normal S1 S2,RRR, No JVD, No murmurs  Respiratory: Lungs clear to auscultation	  Gastrointestinal:  Soft, Non-tender, + BS	  Extremities: Normal range of motion, No clubbing, cyanosis or edema      Home Medications:      MEDICATIONS  (STANDING):  enoxaparin Injectable 40 milliGRAM(s) SubCutaneous every 24 hours  influenza  Vaccine (HIGH DOSE) 0.5 milliLiter(s) IntraMuscular once  lactated ringers. 1000 milliLiter(s) (75 mL/Hr) IV Continuous <Continuous>  piperacillin/tazobactam IVPB.. 3.375 Gram(s) IV Intermittent every 8 hours      TELEMETRY: 	    ECG:  	  RADIOLOGY:   DIAGNOSTIC TESTING:  [ ] Echocardiogram:  [ ]  Catheterization:  [ ] Stress Test:    OTHER: 	    LABS:	 	    Troponin T, High Sensitivity Result: <6 ng/L [0 - 51] (11-22 @ 01:50)  CKMB Units: 1.2 ng/mL [0.0 - 3.8] (11-21 @ 19:32)  Troponin T, High Sensitivity Result: <6 ng/L [0 - 51] (11-21 @ 19:32)  Troponin T, High Sensitivity Result: <6 ng/L [0 - 51] (11-21 @ 11:47)                          12.8   13.09 )-----------( 324      ( 28 Nov 2024 06:32 )             38.2     11-28    138  |  104  |  13  ----------------------------<  101[H]  4.0   |  18[L]  |  0.52    Ca    8.8      28 Nov 2024 06:32  Phos  3.7     11-28  Mg     2.2     11-28    TPro  6.9  /  Alb  3.5  /  TBili  0.4  /  DBili  x   /  AST  67[H]  /  ALT  82[H]  /  AlkPhos  118  11-28    PT/INR - ( 27 Nov 2024 06:57 )   PT: 11.3 sec;   INR: 0.98 ratio         PTT - ( 27 Nov 2024 06:57 )  PTT:32.3 sec

## 2024-11-28 NOTE — PROGRESS NOTE ADULT - SUBJECTIVE AND OBJECTIVE BOX
SURGERY DAILY PROGRESS NOTE    24 Hour/Overnight Events: No acute events overnight    SUBJECTIVE: Patient seen and evaluated on AM rounds. Pain controlled   ------------------------------------------------------------------------------------------------------------  OBJECTIVE:  Vital Signs Last 24 Hrs  T(C): 37.2 (28 Nov 2024 17:06), Max: 37.2 (28 Nov 2024 17:06)  T(F): 99 (28 Nov 2024 17:06), Max: 99 (28 Nov 2024 17:06)  HR: 106 (28 Nov 2024 17:06) (85 - 106)  BP: 126/81 (28 Nov 2024 17:06) (102/67 - 145/91)  BP(mean): --  RR: 18 (28 Nov 2024 17:06) (16 - 18)  SpO2: 93% (28 Nov 2024 17:06) (93% - 96%)    Parameters below as of 28 Nov 2024 17:06  Patient On (Oxygen Delivery Method): room air      I&O's Detail    27 Nov 2024 07:01  -  28 Nov 2024 07:00  --------------------------------------------------------  IN:    Lactated Ringers: 525 mL    Oral Fluid: 400 mL  Total IN: 925 mL    OUT:    Bulb (mL): 0 mL  Total OUT: 0 mL    Total NET: 925 mL      28 Nov 2024 07:01  -  28 Nov 2024 18:36  --------------------------------------------------------  IN:    Oral Fluid: 730 mL  Total IN: 730 mL    OUT:    Bulb (mL): 0 mL  Total OUT: 0 mL    Total NET: 730 mL          LABS:                        12.8   13.09 )-----------( 324      ( 28 Nov 2024 06:32 )             38.2     11-28    138  |  104  |  13  ----------------------------<  101[H]  4.0   |  18[L]  |  0.52    Ca    8.8      28 Nov 2024 06:32  Phos  3.7     11-28  Mg     2.2     11-28    TPro  6.9  /  Alb  3.5  /  TBili  0.4  /  DBili  x   /  AST  67[H]  /  ALT  82[H]  /  AlkPhos  118  11-28    LIVER FUNCTIONS - ( 28 Nov 2024 06:32 )  Alb: 3.5 g/dL / Pro: 6.9 g/dL / ALK PHOS: 118 U/L / ALT: 82 U/L / AST: 67 U/L / GGT: x           PT/INR - ( 27 Nov 2024 06:57 )   PT: 11.3 sec;   INR: 0.98 ratio         PTT - ( 27 Nov 2024 06:57 )  PTT:32.3 sec  Urinalysis Basic - ( 28 Nov 2024 06:32 )    Color: x / Appearance: x / SG: x / pH: x  Gluc: 101 mg/dL / Ketone: x  / Bili: x / Urobili: x   Blood: x / Protein: x / Nitrite: x   Leuk Esterase: x / RBC: x / WBC x   Sq Epi: x / Non Sq Epi: x / Bacteria: x        Physical Exam:  General: NAD, resting comfortably in bed  Pulmonary: Nonlabored breathing, no respiratory distress  Abdominal: soft, nondistended abdomen. Appropriately tender to palpation in RUQ and around incision. Incisional dressings in place with minimal strikethrough. CHIRAG drain in place with no drainage in bulb.   Extremities: WWP    PLAN:   -regular diet  -pain control

## 2024-11-28 NOTE — PROGRESS NOTE ADULT - ASSESSMENT
A/P  65 yo F no PMH here with epigastric and abdominal pain radiating to the back since 0700 today. Pain is constant and worse w eating assoc w nausea and few episodes of emesis. Patient reports SOB from the abdominal pain, not being able to take a full breath in.      #Epigastric/Abdominal pain, N/V  -pleuritic pain, referred from abdomen  -HST x3 negative, CK, CKMB normal   -EKG shows NSR HR 86 with no acute ischemic changes when compared to prior EKG August 2023  -TTE normal lv fxn, EF 74%  -CXR shows clear lungs  -CT A/P shows Mild extrahepatic biliary ductal dilatation with normal distal tapering at the ampulla, unchanged from 8/21/2023. Note is made of an adjacent periampullary duodenal diverticulum, possibly the cause of the mildly dilated CBD  -MR MRCP noted   -CT Angio Chest shows no PE. Findings suggestive of acute cholecystitis with possible contained perforations or abscesses, are unchanged in size and better evaluated on prior CT abdomen and pelvis from 11/23/2024.  -HIDA scan findings compatible with acute cholecystitis  -GI/Surgery f/u   -hydrate, diet per gi    #Acute cholecystitis  -imaging as above   -s/p laparoscopic fenestrated subtotal cholecystectomy 11/27  -cv stable postop  -care per surgery         dvt ppx

## 2024-11-28 NOTE — PROGRESS NOTE ADULT - SUBJECTIVE AND OBJECTIVE BOX
Patient is a 64y old  Female who presents with a chief complaint of dyspepsia (28 Nov 2024 08:47)      SUBJECTIVE / OVERNIGHT EVENTS: Comfortable without new complaints.   Review of Systems  chest pain no  palpitations no  sob no  nausea no  headache no    MEDICATIONS  (STANDING):  enoxaparin Injectable 40 milliGRAM(s) SubCutaneous every 24 hours  influenza  Vaccine (HIGH DOSE) 0.5 milliLiter(s) IntraMuscular once  lactated ringers. 1000 milliLiter(s) (75 mL/Hr) IV Continuous <Continuous>  piperacillin/tazobactam IVPB.. 3.375 Gram(s) IV Intermittent every 8 hours    MEDICATIONS  (PRN):  acetaminophen     Tablet .. 1000 milliGRAM(s) Oral every 6 hours PRN Mild Pain (1 - 3)  oxyCODONE    IR 2.5 milliGRAM(s) Oral every 4 hours PRN Moderate Pain (4 - 6)  oxyCODONE    IR 5 milliGRAM(s) Oral every 4 hours PRN Severe Pain (7 - 10)      Vital Signs Last 24 Hrs  T(C): 36.8 (28 Nov 2024 09:12), Max: 36.9 (28 Nov 2024 05:28)  T(F): 98.3 (28 Nov 2024 09:12), Max: 98.4 (28 Nov 2024 05:28)  HR: 95 (28 Nov 2024 09:12) (85 - 102)  BP: 125/80 (28 Nov 2024 09:12) (102/67 - 125/80)  BP(mean): 85 (27 Nov 2024 18:30) (83 - 90)  RR: 18 (28 Nov 2024 09:12) (14 - 26)  SpO2: 93% (28 Nov 2024 09:12) (92% - 96%)    Parameters below as of 28 Nov 2024 09:12  Patient On (Oxygen Delivery Method): nasal cannula  O2 Flow (L/min): 2      PHYSICAL EXAM:  GENERAL: NAD, well-developed  HEAD:  Atraumatic, Normocephalic  EYES: EOMI, PERRLA, conjunctiva and sclera clear  NECK: Supple, No JVD  CHEST/LUNG: Clear to auscultation bilaterally; No wheeze  HEART: Regular rate and rhythm; No murmurs, rubs, or gallops  ABDOMEN: Soft, Nontender, Nondistended; Bowel sounds present Incisions with clean dressing. CHIRAG with litle drainage  EXTREMITIES:  2+ Peripheral Pulses, No clubbing, cyanosis, or edema  PSYCH: AAOx3  NEUROLOGY: non-focal  SKIN: No rashes or lesions    LABS:                        12.8   13.09 )-----------( 324      ( 28 Nov 2024 06:32 )             38.2     11-28    138  |  104  |  13  ----------------------------<  101[H]  4.0   |  18[L]  |  0.52    Ca    8.8      28 Nov 2024 06:32  Phos  3.7     11-28  Mg     2.2     11-28    TPro  6.9  /  Alb  3.5  /  TBili  0.4  /  DBili  x   /  AST  67[H]  /  ALT  82[H]  /  AlkPhos  118  11-28    PT/INR - ( 27 Nov 2024 06:57 )   PT: 11.3 sec;   INR: 0.98 ratio         PTT - ( 27 Nov 2024 06:57 )  PTT:32.3 sec      Urinalysis Basic - ( 28 Nov 2024 06:32 )    Color: x / Appearance: x / SG: x / pH: x  Gluc: 101 mg/dL / Ketone: x  / Bili: x / Urobili: x   Blood: x / Protein: x / Nitrite: x   Leuk Esterase: x / RBC: x / WBC x   Sq Epi: x / Non Sq Epi: x / Bacteria: x          RADIOLOGY & ADDITIONAL TESTS:    Imaging Personally Reviewed:    Consultant(s) Notes Reviewed:      Care Discussed with Consultants/Other Providers:

## 2024-11-28 NOTE — CHART NOTE - NSCHARTNOTEFT_GEN_A_CORE
POST-OPERATIVE NOTE    Subjective:  Patient is s/p laparoscopic fenestrated subtotal cholecystectomy. Recovering appropriately. Sleeping comfortably before interview. Denies CP, SOB, nausea, vomiting.     Vital Signs Last 24 Hrs  T(C): 36.8 (28 Nov 2024 01:32), Max: 37.1 (27 Nov 2024 11:28)  T(F): 98.2 (28 Nov 2024 01:32), Max: 98.8 (27 Nov 2024 11:28)  HR: 97 (28 Nov 2024 01:32) (85 - 102)  BP: 108/72 (28 Nov 2024 01:32) (102/67 - 123/67)  BP(mean): 85 (27 Nov 2024 18:30) (83 - 90)  RR: 18 (28 Nov 2024 01:32) (14 - 26)  SpO2: 95% (28 Nov 2024 01:32) (92% - 95%)    Parameters below as of 28 Nov 2024 01:32  Patient On (Oxygen Delivery Method): nasal cannula  O2 Flow (L/min): 2    I&O's Detail    26 Nov 2024 07:01  -  27 Nov 2024 07:00  --------------------------------------------------------  IN:    Oral Fluid: 480 mL  Total IN: 480 mL    OUT:  Total OUT: 0 mL    Total NET: 480 mL      27 Nov 2024 07:01  -  28 Nov 2024 02:54  --------------------------------------------------------  IN:  Total IN: 0 mL    OUT:    Bulb (mL): 0 mL  Total OUT: 0 mL    Total NET: 0 mL        piperacillin/tazobactam IVPB.. 3.375  enoxaparin Injectable 40  piperacillin/tazobactam IVPB.. 3.375    PAST MEDICAL & SURGICAL HISTORY:  Fall, subsequent encounter      No significant past surgical history            Physical Exam:  General: NAD, resting comfortably in bed  Pulmonary: Nonlabored breathing, no respiratory distress  Abdominal: soft, nondistended abdomen. Appropriately tender to palpation in RUQ and around incision. Incisional dressings in place with minimal strikethrough. CHIRAG drain in place with no drainage in bulb.   Extremities: WWP      LABS:                        13.1   7.12  )-----------( 334      ( 27 Nov 2024 06:56 )             38.6     11-27    140  |  103  |  13  ----------------------------<  97  3.8   |  24  |  0.78    Ca    9.3      27 Nov 2024 06:58  Phos  3.8     11-27  Mg     2.4     11-27      PT/INR - ( 27 Nov 2024 06:57 )   PT: 11.3 sec;   INR: 0.98 ratio         PTT - ( 27 Nov 2024 06:57 )  PTT:32.3 sec  CAPILLARY BLOOD GLUCOSE      POCT Blood Glucose.: 104 mg/dL (27 Nov 2024 12:27)  POCT Blood Glucose.: 103 mg/dL (27 Nov 2024 07:59)      Radiology and Additional Studies:    Assessment:  The patient is a 64y Female who is now several hours post-op from a laparoscopic fenestrated subtotal cholecystectomy    Plan:  - Pain control as needed  - DVT ppx  - OOB and ambulating as tolerated  - CLD advance as tolerated   - F/u AM labs    Trauma/ACS [id number]

## 2024-11-29 ENCOUNTER — TRANSCRIPTION ENCOUNTER (OUTPATIENT)
Age: 64
End: 2024-11-29

## 2024-11-29 VITALS
OXYGEN SATURATION: 94 % | HEART RATE: 109 BPM | DIASTOLIC BLOOD PRESSURE: 68 MMHG | TEMPERATURE: 98 F | RESPIRATION RATE: 18 BRPM | SYSTOLIC BLOOD PRESSURE: 110 MMHG

## 2024-11-29 LAB
ALBUMIN SERPL ELPH-MCNC: 3.2 G/DL — LOW (ref 3.3–5)
ALP SERPL-CCNC: 102 U/L — SIGNIFICANT CHANGE UP (ref 40–120)
ALT FLD-CCNC: 57 U/L — HIGH (ref 10–45)
ANION GAP SERPL CALC-SCNC: 13 MMOL/L — SIGNIFICANT CHANGE UP (ref 5–17)
AST SERPL-CCNC: 26 U/L — SIGNIFICANT CHANGE UP (ref 10–40)
BILIRUB DIRECT SERPL-MCNC: <0.1 MG/DL — SIGNIFICANT CHANGE UP (ref 0–0.3)
BILIRUB INDIRECT FLD-MCNC: >0.3 MG/DL — SIGNIFICANT CHANGE UP (ref 0.2–1)
BILIRUB SERPL-MCNC: 0.4 MG/DL — SIGNIFICANT CHANGE UP (ref 0.2–1.2)
BUN SERPL-MCNC: 8 MG/DL — SIGNIFICANT CHANGE UP (ref 7–23)
CALCIUM SERPL-MCNC: 8.4 MG/DL — SIGNIFICANT CHANGE UP (ref 8.4–10.5)
CHLORIDE SERPL-SCNC: 105 MMOL/L — SIGNIFICANT CHANGE UP (ref 96–108)
CO2 SERPL-SCNC: 20 MMOL/L — LOW (ref 22–31)
CREAT SERPL-MCNC: 0.57 MG/DL — SIGNIFICANT CHANGE UP (ref 0.5–1.3)
EGFR: 100 ML/MIN/1.73M2 — SIGNIFICANT CHANGE UP
GLUCOSE SERPL-MCNC: 100 MG/DL — HIGH (ref 70–99)
HCT VFR BLD CALC: 35 % — SIGNIFICANT CHANGE UP (ref 34.5–45)
HGB BLD-MCNC: 11.5 G/DL — SIGNIFICANT CHANGE UP (ref 11.5–15.5)
MAGNESIUM SERPL-MCNC: 2.2 MG/DL — SIGNIFICANT CHANGE UP (ref 1.6–2.6)
MCHC RBC-ENTMCNC: 30.7 PG — SIGNIFICANT CHANGE UP (ref 27–34)
MCHC RBC-ENTMCNC: 32.9 G/DL — SIGNIFICANT CHANGE UP (ref 32–36)
MCV RBC AUTO: 93.3 FL — SIGNIFICANT CHANGE UP (ref 80–100)
NRBC # BLD: 0 /100 WBCS — SIGNIFICANT CHANGE UP (ref 0–0)
PHOSPHATE SERPL-MCNC: 2.3 MG/DL — LOW (ref 2.5–4.5)
PLATELET # BLD AUTO: 315 K/UL — SIGNIFICANT CHANGE UP (ref 150–400)
POTASSIUM SERPL-MCNC: 3.6 MMOL/L — SIGNIFICANT CHANGE UP (ref 3.5–5.3)
POTASSIUM SERPL-SCNC: 3.6 MMOL/L — SIGNIFICANT CHANGE UP (ref 3.5–5.3)
PROT SERPL-MCNC: 6.5 G/DL — SIGNIFICANT CHANGE UP (ref 6–8.3)
RBC # BLD: 3.75 M/UL — LOW (ref 3.8–5.2)
RBC # FLD: 12.7 % — SIGNIFICANT CHANGE UP (ref 10.3–14.5)
SODIUM SERPL-SCNC: 138 MMOL/L — SIGNIFICANT CHANGE UP (ref 135–145)
WBC # BLD: 10.3 K/UL — SIGNIFICANT CHANGE UP (ref 3.8–10.5)
WBC # FLD AUTO: 10.3 K/UL — SIGNIFICANT CHANGE UP (ref 3.8–10.5)

## 2024-11-29 PROCEDURE — 96376 TX/PRO/DX INJ SAME DRUG ADON: CPT

## 2024-11-29 PROCEDURE — 85610 PROTHROMBIN TIME: CPT

## 2024-11-29 PROCEDURE — A9537: CPT

## 2024-11-29 PROCEDURE — 85730 THROMBOPLASTIN TIME PARTIAL: CPT

## 2024-11-29 PROCEDURE — 80048 BASIC METABOLIC PNL TOTAL CA: CPT

## 2024-11-29 PROCEDURE — C1889: CPT

## 2024-11-29 PROCEDURE — 74177 CT ABD & PELVIS W/CONTRAST: CPT | Mod: MC

## 2024-11-29 PROCEDURE — 85025 COMPLETE CBC W/AUTO DIFF WBC: CPT

## 2024-11-29 PROCEDURE — 74183 MRI ABD W/O CNTR FLWD CNTR: CPT | Mod: MC

## 2024-11-29 PROCEDURE — 80053 COMPREHEN METABOLIC PANEL: CPT

## 2024-11-29 PROCEDURE — 88304 TISSUE EXAM BY PATHOLOGIST: CPT

## 2024-11-29 PROCEDURE — 96375 TX/PRO/DX INJ NEW DRUG ADDON: CPT

## 2024-11-29 PROCEDURE — 99285 EMERGENCY DEPT VISIT HI MDM: CPT

## 2024-11-29 PROCEDURE — 93010 ELECTROCARDIOGRAM REPORT: CPT | Mod: 76

## 2024-11-29 PROCEDURE — 71275 CT ANGIOGRAPHY CHEST: CPT | Mod: MC

## 2024-11-29 PROCEDURE — 78227 HEPATOBIL SYST IMAGE W/DRUG: CPT

## 2024-11-29 PROCEDURE — 84484 ASSAY OF TROPONIN QUANT: CPT

## 2024-11-29 PROCEDURE — 83735 ASSAY OF MAGNESIUM: CPT

## 2024-11-29 PROCEDURE — C9399: CPT

## 2024-11-29 PROCEDURE — 99024 POSTOP FOLLOW-UP VISIT: CPT

## 2024-11-29 PROCEDURE — 71046 X-RAY EXAM CHEST 2 VIEWS: CPT

## 2024-11-29 PROCEDURE — 96374 THER/PROPH/DIAG INJ IV PUSH: CPT

## 2024-11-29 PROCEDURE — 93306 TTE W/DOPPLER COMPLETE: CPT

## 2024-11-29 PROCEDURE — 36415 COLL VENOUS BLD VENIPUNCTURE: CPT

## 2024-11-29 PROCEDURE — 76705 ECHO EXAM OF ABDOMEN: CPT

## 2024-11-29 PROCEDURE — 85027 COMPLETE CBC AUTOMATED: CPT

## 2024-11-29 PROCEDURE — 86901 BLOOD TYPING SEROLOGIC RH(D): CPT

## 2024-11-29 PROCEDURE — 82553 CREATINE MB FRACTION: CPT

## 2024-11-29 PROCEDURE — 84145 PROCALCITONIN (PCT): CPT

## 2024-11-29 PROCEDURE — 83690 ASSAY OF LIPASE: CPT

## 2024-11-29 PROCEDURE — 93356 MYOCRD STRAIN IMG SPCKL TRCK: CPT

## 2024-11-29 PROCEDURE — 86850 RBC ANTIBODY SCREEN: CPT

## 2024-11-29 PROCEDURE — 82550 ASSAY OF CK (CPK): CPT

## 2024-11-29 PROCEDURE — 82962 GLUCOSE BLOOD TEST: CPT

## 2024-11-29 PROCEDURE — 86900 BLOOD TYPING SEROLOGIC ABO: CPT

## 2024-11-29 PROCEDURE — 84100 ASSAY OF PHOSPHORUS: CPT

## 2024-11-29 PROCEDURE — 80076 HEPATIC FUNCTION PANEL: CPT

## 2024-11-29 PROCEDURE — 93005 ELECTROCARDIOGRAM TRACING: CPT

## 2024-11-29 RX ORDER — OXYCODONE HYDROCHLORIDE 30 MG/1
1 TABLET ORAL
Qty: 5 | Refills: 0
Start: 2024-11-29

## 2024-11-29 RX ORDER — AMOXICILLIN/POTASSIUM CLAV 250-125 MG
1 TABLET ORAL
Qty: 4 | Refills: 0
Start: 2024-11-29 | End: 2024-11-30

## 2024-11-29 RX ORDER — POTASSIUM CHLORIDE 600 MG/1
40 TABLET, EXTENDED RELEASE ORAL ONCE
Refills: 0 | Status: COMPLETED | OUTPATIENT
Start: 2024-11-29 | End: 2024-11-29

## 2024-11-29 RX ORDER — SODIUM,POTASSIUM PHOSPHATES 278-250MG
2 POWDER IN PACKET (EA) ORAL ONCE
Refills: 0 | Status: COMPLETED | OUTPATIENT
Start: 2024-11-29 | End: 2024-11-29

## 2024-11-29 RX ORDER — ACETAMINOPHEN 500MG 500 MG/1
2 TABLET, COATED ORAL
Qty: 0 | Refills: 0 | DISCHARGE
Start: 2024-11-29

## 2024-11-29 RX ADMIN — ENOXAPARIN SODIUM 40 MILLIGRAM(S): 30 INJECTION SUBCUTANEOUS at 13:53

## 2024-11-29 RX ADMIN — Medication 2 PACKET(S): at 09:14

## 2024-11-29 RX ADMIN — PIPERACILLIN SODIUM AND TAZOBACTAM SODIUM 25 GRAM(S): 4; .5 INJECTION, POWDER, LYOPHILIZED, FOR SOLUTION INTRAVENOUS at 13:53

## 2024-11-29 RX ADMIN — POTASSIUM CHLORIDE 40 MILLIEQUIVALENT(S): 600 TABLET, EXTENDED RELEASE ORAL at 09:14

## 2024-11-29 RX ADMIN — PIPERACILLIN SODIUM AND TAZOBACTAM SODIUM 25 GRAM(S): 4; .5 INJECTION, POWDER, LYOPHILIZED, FOR SOLUTION INTRAVENOUS at 05:51

## 2024-11-29 NOTE — PROGRESS NOTE ADULT - PROVIDER SPECIALTY LIST ADULT
Cardiology
Cardiology
Gastroenterology
Internal Medicine
Internal Medicine
Surgery
Surgery
Trauma Surgery
Cardiology
Gastroenterology
Internal Medicine
Surgery
Surgery
Cardiology
Gastroenterology
Gastroenterology
Internal Medicine
Surgery

## 2024-11-29 NOTE — PROGRESS NOTE ADULT - TIME BILLING
Agree with above ACP note.  cv stable  surgery f/u  ir eval
Agree with above ACP note.  cv stable post op  sx f/u  cont current tx
Agree with above ACP note.  cv stable  surgery f/u  per IR, plan for percutaneous cholecystostomy tube for Friday 11/29
Agree with above ACP note.  cv stable post op  sx f/u  cont current tx

## 2024-11-29 NOTE — DISCHARGE NOTE NURSING/CASE MANAGEMENT/SOCIAL WORK - NSDPACMPNY_GEN_ALL_CORE
Left Voicemail (1st Attempt) and Sent Mychart (1st Attempt) for the patient to call back and reschedule the following:    Appointment type: Return Shoulder  Provider: Dr. Juan  Return date: 11/27/24 r/s to next available       Family

## 2024-11-29 NOTE — PROGRESS NOTE ADULT - ASSESSMENT
A/P  63 yo F no PMH here with epigastric and abdominal pain radiating to the back since 0700 today. Pain is constant and worse w eating assoc w nausea and few episodes of emesis. Patient reports SOB from the abdominal pain, not being able to take a full breath in.      #Epigastric/Abdominal pain, N/V  -pleuritic pain, referred from abdomen  -HST x3 negative, CK, CKMB normal   -EKG shows NSR HR 86 with no acute ischemic changes when compared to prior EKG August 2023  -TTE normal lv fxn, EF 74%  -CXR shows clear lungs  -CT A/P shows Mild extrahepatic biliary ductal dilatation with normal distal tapering at the ampulla, unchanged from 8/21/2023. Note is made of an adjacent periampullary duodenal diverticulum, possibly the cause of the mildly dilated CBD  -MR MRCP noted   -CT Angio Chest shows no PE. Findings suggestive of acute cholecystitis with possible contained perforations or abscesses, are unchanged in size and better evaluated on prior CT abdomen and pelvis from 11/23/2024.  -HIDA scan findings compatible with acute cholecystitis  -GI/Surgery f/u   -hydrate, diet per gi/surg    #Acute cholecystitis  -imaging as above   -s/p laparoscopic fenestrated subtotal cholecystectomy 11/27  -cv stable postop  -care per surgery         dvt ppx

## 2024-11-29 NOTE — PROGRESS NOTE ADULT - SUBJECTIVE AND OBJECTIVE BOX
ACUTE CARE SURGERY DAILY PROGRESS NOTE    24 Hour/Overnight Events: s/p laparoscopic fenestrated subtotal cholecystectomy. On POC, recovering appropriately    SUBJECTIVE: Patient seen and evaluated on AM rounds. Pt reports appropriate surgical incisional pain, which is adequately controlled on current regimen. Tolerating clear diet. Denies fevers/chills, chest pain, dyspnea, nausea, vomiting, and diarrhea    ------------------------------------------------------------------------------------------------------------  OBJECTIVE:  Vital Signs Last 24 Hrs  T(C): 37.1 (29 Nov 2024 05:41), Max: 37.2 (28 Nov 2024 17:06)  T(F): 98.8 (29 Nov 2024 05:41), Max: 99 (28 Nov 2024 17:06)  HR: 100 (29 Nov 2024 05:41) (95 - 109)  BP: 113/77 (29 Nov 2024 05:41) (113/77 - 145/91)  BP(mean): --  RR: 18 (29 Nov 2024 05:41) (18 - 18)  SpO2: 95% (29 Nov 2024 05:41) (93% - 95%)    Parameters below as of 29 Nov 2024 05:41  Patient On (Oxygen Delivery Method): room air      I&O's Detail    27 Nov 2024 07:01  -  28 Nov 2024 07:00  --------------------------------------------------------  IN:    Lactated Ringers: 525 mL    Oral Fluid: 400 mL  Total IN: 925 mL    OUT:    Bulb (mL): 0 mL  Total OUT: 0 mL    Total NET: 925 mL      28 Nov 2024 07:01  -  29 Nov 2024 06:21  --------------------------------------------------------  IN:    Lactated Ringers: 375 mL    Oral Fluid: 930 mL  Total IN: 1305 mL    OUT:    Bulb (mL): 5 mL  Total OUT: 5 mL    Total NET: 1300 mL      LABS:                        12.8   13.09 )-----------( 324      ( 28 Nov 2024 06:32 )             38.2     11-28    138  |  104  |  13  ----------------------------<  101[H]  4.0   |  18[L]  |  0.52    Ca    8.8      28 Nov 2024 06:32  Phos  3.7     11-28  Mg     2.2     11-28    TPro  6.9  /  Alb  3.5  /  TBili  0.4  /  DBili  x   /  AST  67[H]  /  ALT  82[H]  /  AlkPhos  118  11-28    LIVER FUNCTIONS - ( 28 Nov 2024 06:32 )  Alb: 3.5 g/dL / Pro: 6.9 g/dL / ALK PHOS: 118 U/L / ALT: 82 U/L / AST: 67 U/L / GGT: x           PT/INR - ( 27 Nov 2024 06:57 )   PT: 11.3 sec;   INR: 0.98 ratio    PTT - ( 27 Nov 2024 06:57 )  PTT:32.3 sec    Urinalysis Basic - ( 28 Nov 2024 06:32 )  Color: x / Appearance: x / SG: x / pH: x  Gluc: 101 mg/dL / Ketone: x  / Bili: x / Urobili: x   Blood: x / Protein: x / Nitrite: x   Leuk Esterase: x / RBC: x / WBC x   Sq Epi: x / Non Sq Epi: x / Bacteria: x      PHYSICAL EXAM:  Constitutional: Well developed, well nourished, NAD  Chest: Symmetric chest rise bilaterally, no increased WOB  Abdomen: Soft, nondistended. Appropriately tender to palpation in RUQ and around incision. Incisional dressings in place with minimal strikethrough. CHIRAG drain in place with no drainage in bulb.  Extremities: Warm to touch, soft, normal strength, sensory and motor intact. No cyanosis/erythema/edema/hematoma   ACUTE CARE SURGERY DAILY PROGRESS NOTE    24 Hour/Overnight Events: s/p laparoscopic fenestrated subtotal cholecystectomy. On POC, recovering appropriately. Overnight, pt mildly tachycardic (HR: 109)    SUBJECTIVE: Patient seen and evaluated on AM rounds. Pt reports appropriate surgical incisional pain, which is adequately controlled on current regimen. Tolerating regular diet. Denies fevers/chills, chest pain, dyspnea, nausea, vomiting, and diarrhea    ------------------------------------------------------------------------------------------------------------  OBJECTIVE:  Vital Signs Last 24 Hrs  T(C): 37.1 (29 Nov 2024 05:41), Max: 37.2 (28 Nov 2024 17:06)  T(F): 98.8 (29 Nov 2024 05:41), Max: 99 (28 Nov 2024 17:06)  HR: 100 (29 Nov 2024 05:41) (95 - 109)  BP: 113/77 (29 Nov 2024 05:41) (113/77 - 145/91)  BP(mean): --  RR: 18 (29 Nov 2024 05:41) (18 - 18)  SpO2: 95% (29 Nov 2024 05:41) (93% - 95%)    Parameters below as of 29 Nov 2024 05:41  Patient On (Oxygen Delivery Method): room air      I&O's Detail    27 Nov 2024 07:01  -  28 Nov 2024 07:00  --------------------------------------------------------  IN:    Lactated Ringers: 525 mL    Oral Fluid: 400 mL  Total IN: 925 mL    OUT:    Bulb (mL): 0 mL  Total OUT: 0 mL    Total NET: 925 mL      28 Nov 2024 07:01  -  29 Nov 2024 06:21  --------------------------------------------------------  IN:    Lactated Ringers: 375 mL    Oral Fluid: 930 mL  Total IN: 1305 mL    OUT:    Bulb (mL): 5 mL  Total OUT: 5 mL    Total NET: 1300 mL      LABS:                        12.8   13.09 )-----------( 324      ( 28 Nov 2024 06:32 )             38.2     11-28    138  |  104  |  13  ----------------------------<  101[H]  4.0   |  18[L]  |  0.52    Ca    8.8      28 Nov 2024 06:32  Phos  3.7     11-28  Mg     2.2     11-28    TPro  6.9  /  Alb  3.5  /  TBili  0.4  /  DBili  x   /  AST  67[H]  /  ALT  82[H]  /  AlkPhos  118  11-28    LIVER FUNCTIONS - ( 28 Nov 2024 06:32 )  Alb: 3.5 g/dL / Pro: 6.9 g/dL / ALK PHOS: 118 U/L / ALT: 82 U/L / AST: 67 U/L / GGT: x           PT/INR - ( 27 Nov 2024 06:57 )   PT: 11.3 sec;   INR: 0.98 ratio    PTT - ( 27 Nov 2024 06:57 )  PTT:32.3 sec    Urinalysis Basic - ( 28 Nov 2024 06:32 )  Color: x / Appearance: x / SG: x / pH: x  Gluc: 101 mg/dL / Ketone: x  / Bili: x / Urobili: x   Blood: x / Protein: x / Nitrite: x   Leuk Esterase: x / RBC: x / WBC x   Sq Epi: x / Non Sq Epi: x / Bacteria: x      PHYSICAL EXAM:  Constitutional: Well developed, well nourished, NAD  Chest: Symmetric chest rise bilaterally, no increased WOB  Abdomen: Soft, nondistended. Appropriately tender to palpation in RUQ and around incision. Incisional dressings in place with minimal strikethrough. CHIRAG drain in place with no drainage in bulb.  Extremities: Warm to touch, soft, normal strength, sensory and motor intact. No cyanosis/erythema/edema/hematoma

## 2024-11-29 NOTE — CHART NOTE - NSCHARTNOTEFT_GEN_A_CORE
Pt on IR schedule for percutaneous cholecystostomy today however pt s/p laparoscopic fenestrated subtotal cholecystectomy on 11/27. Pt on IR schedule for percutaneous cholecystostomy today however pt s/p laparoscopic fenestrated subtotal cholecystectomy on 11/27.  -IR will sign off, please re-consult if needed

## 2024-11-29 NOTE — PROGRESS NOTE ADULT - ASSESSMENT
64 f with    s/p partial cholecystectomy for Acute cholecystitis  - Zosyn  - pain control  - GI follow DR Loya   - Postop care  - clears advance as tolerated  - CHIRAG     EKG changes  - cardiac enzymes noted   - Echo no significant findings  - Cardiology follow Dr Chanel     DVT prophylaxis.    d/w patient     Rad Ramos MD phone [phone]

## 2024-11-29 NOTE — PROGRESS NOTE ADULT - ASSESSMENT
epigastric pain  found to have cholecystiits  s/p lap santo  continue post op care  hgb stable  wbc nromal

## 2024-11-29 NOTE — PROGRESS NOTE ADULT - ASSESSMENT
64F with no significant PMH presents with epigastric pain radiating to the back since the morning. CT A/P (11/21) showed mild CBD dilation of 9mm and a duodenal diverticulum. Repeat CT A/P (11/23) showed findings c/f acute cholecystitis with contained perforation at the gallbladder neck. HIDA (11/25) confirmed acute santo  Pt now s/p laparoscopic fenestrated subtotal cholecystectomy on 11/27    PLAN:   - Pain control as needed  - OOB and ambulating as tolerated  - CLD advance as tolerated   - DVT ppx      ACS  [id number] 64F with no significant PMH presents with epigastric pain radiating to the back since the morning. CT A/P (11/21) showed mild CBD dilation of 9mm and a duodenal diverticulum. Repeat CT A/P (11/23) showed findings c/f acute cholecystitis with contained perforation at the gallbladder neck. HIDA (11/25) confirmed acute santo  Pt now s/p laparoscopic fenestrated subtotal cholecystectomy on 11/27    PLAN:   - EKG to eval for tachycardia overnight  - Pain control as needed  - OOB and ambulating as tolerated  - Regular diet  - DVT ppx      ACS  [id number]

## 2024-11-29 NOTE — PROGRESS NOTE ADULT - NS ATTEND AMEND GEN_ALL_CORE FT
seen and examined 11-30-24 @ 1003    tolerating regular diet w/o nausea or vomiting    soft / NT / ND  no jaundice    Jed drain - 5 mL drainage / 24 hours    WBC = 10  LFTs - wnl      11/27/2024 - laparoscopic subtotal fenestrating cholecystectomy for perforated cholecystitis with pericholecystic abscess  -discharge home with Jed drain to bulb suction  -Rx Augmentin x 2 days seen and examined 11-29-24 @ 1003    tolerating regular diet w/o nausea or vomiting    soft / NT / ND  no jaundice    Jed drain - 5 mL drainage / 24 hours    WBC = 10  LFTs - wnl      11/27/2024 - laparoscopic subtotal fenestrating cholecystectomy for perforated cholecystitis with pericholecystic abscess  -discharge home with Jed drain to bulb suction  -Rx Augmentin x 2 days

## 2024-11-29 NOTE — PROGRESS NOTE ADULT - SUBJECTIVE AND OBJECTIVE BOX
Patient is a 64y Female     Patient is a 64y old  Female who presents with a chief complaint of dyspepsia (28 Nov 2024 08:47)      HPI:  64F no PMH here with epigastric pain radiating to the back since 0700 today. Pain is constant and worse w eating assoc w nausea and few episodes of emesis. NO fevers. No hematemesis. No diarrhea, No fevers. Not on any meds, No EtOH. No prior abd surg. (21 Nov 2024 17:49)      PAST MEDICAL & SURGICAL HISTORY:  Fall, subsequent encounter      No significant past surgical history          MEDICATIONS  (STANDING):  enoxaparin Injectable 40 milliGRAM(s) SubCutaneous every 24 hours  influenza  Vaccine (HIGH DOSE) 0.5 milliLiter(s) IntraMuscular once  piperacillin/tazobactam IVPB.. 3.375 Gram(s) IV Intermittent every 8 hours  potassium chloride    Tablet ER 40 milliEquivalent(s) Oral once  potassium phosphate / sodium phosphate Powder (PHOS-NaK) 2 Packet(s) Oral once      Allergies    No Known Allergies    Intolerances        SOCIAL HISTORY:  Denies ETOh,Smoking,     FAMILY HISTORY:      REVIEW OF SYSTEMS:    CONSTITUTIONAL: No weakness, fevers or chills  EYES/ENT: No visual changes;  No vertigo or throat pain   NECK: No pain or stiffness  RESPIRATORY: No cough, wheezing, hemoptysis; No shortness of breath  CARDIOVASCULAR: No chest pain or palpitations  GASTROINTESTINAL: No abdominal or epigastric pain. No nausea, vomiting, or hematemesis; No diarrhea or constipation. No melena or hematochezia.  GENITOURINARY: No dysuria, frequency or hematuria  NEUROLOGICAL: No numbness or weakness  SKIN: No itching, burning, rashes, or lesions   All other review of systems is negative unless indicated above.    VITAL:  T(C): , Max: 37.2 (11-28-24 @ 17:06)  T(F): , Max: 99 (11-28-24 @ 17:06)  HR: 100 (11-29-24 @ 05:41)  BP: 113/77 (11-29-24 @ 05:41)  BP(mean): --  RR: 18 (11-29-24 @ 05:41)  SpO2: 95% (11-29-24 @ 05:41)  Wt(kg): --    I and O's:    11-27 @ 07:01 - 11-28 @ 07:00  --------------------------------------------------------  IN: 925 mL / OUT: 0 mL / NET: 925 mL    11-28 @ 07:01 - 11-29 @ 07:00  --------------------------------------------------------  IN: 1730 mL / OUT: 5 mL / NET: 1725 mL          PHYSICAL EXAM:    Constitutional: NAD  HEENT: PERRLA,   Neck: No JVD  Respiratory: CTA B/L  Cardiovascular: S1 and S2  Gastrointestinal: BS+, soft, NT/ND  Extremities: No peripheral edema  Neurological: A/O x 3, no focal deficits  Psychiatric: Normal mood, normal affect  : No Kidd  Skin: No rashes  Access: Not applicable  Back: No CVA tenderness    LABS:                        11.5   10.30 )-----------( 315      ( 29 Nov 2024 07:22 )             35.0     11-29    138  |  105  |  8   ----------------------------<  100[H]  3.6   |  20[L]  |  0.57    Ca    8.4      29 Nov 2024 07:22  Phos  2.3     11-29  Mg     2.2     11-29    TPro  6.5  /  Alb  3.2[L]  /  TBili  0.4  /  DBili  <0.1  /  AST  26  /  ALT  57[H]  /  AlkPhos  102  11-29          RADIOLOGY & ADDITIONAL STUDIES:

## 2024-11-29 NOTE — DISCHARGE NOTE NURSING/CASE MANAGEMENT/SOCIAL WORK - FINANCIAL ASSISTANCE
[facility] provides services at a reduced cost to those who are determined to be eligible through [facility]’s financial assistance program. Information regarding [facility]’s financial assistance program can be found by going to [email or web address] or by calling [phone].

## 2024-11-29 NOTE — PROGRESS NOTE ADULT - SUBJECTIVE AND OBJECTIVE BOX
Patient is a 64y old  Female who presents with a chief complaint of dyspepsia (29 Nov 2024 08:59)      SUBJECTIVE / OVERNIGHT EVENTS: Comfortable without new complaints.   Review of Systems  chest pain no  palpitations no  sob no  nausea no  headache no    MEDICATIONS  (STANDING):  enoxaparin Injectable 40 milliGRAM(s) SubCutaneous every 24 hours  influenza  Vaccine (HIGH DOSE) 0.5 milliLiter(s) IntraMuscular once  piperacillin/tazobactam IVPB.. 3.375 Gram(s) IV Intermittent every 8 hours    MEDICATIONS  (PRN):  acetaminophen     Tablet .. 1000 milliGRAM(s) Oral every 6 hours PRN Mild Pain (1 - 3)  oxyCODONE    IR 2.5 milliGRAM(s) Oral every 4 hours PRN Moderate Pain (4 - 6)  oxyCODONE    IR 5 milliGRAM(s) Oral every 4 hours PRN Severe Pain (7 - 10)      Vital Signs Last 24 Hrs  T(C): 36.5 (29 Nov 2024 13:48), Max: 37.2 (28 Nov 2024 21:42)  T(F): 97.7 (29 Nov 2024 13:48), Max: 98.9 (28 Nov 2024 21:42)  HR: 109 (29 Nov 2024 13:48) (97 - 109)  BP: 110/68 (29 Nov 2024 13:48) (109/72 - 124/82)  BP(mean): --  RR: 18 (29 Nov 2024 13:48) (18 - 18)  SpO2: 94% (29 Nov 2024 13:48) (93% - 95%)    Parameters below as of 29 Nov 2024 13:48  Patient On (Oxygen Delivery Method): room air        PHYSICAL EXAM:  GENERAL: NAD, well-developed  HEAD:  Atraumatic, Normocephalic  EYES: EOMI, PERRLA, conjunctiva and sclera clear  NECK: Supple, No JVD  CHEST/LUNG: Clear to auscultation bilaterally; No wheeze  HEART: Regular rate and rhythm; No murmurs, rubs, or gallops  ABDOMEN: Soft, Nontender, Nondistended; Bowel sounds present CHIRAG in place.  EXTREMITIES:  2+ Peripheral Pulses, No clubbing, cyanosis, or edema  PSYCH: AAOx3  NEUROLOGY: non-focal  SKIN: No rashes or lesions    LABS:                        11.5   10.30 )-----------( 315      ( 29 Nov 2024 07:22 )             35.0     11-29    138  |  105  |  8   ----------------------------<  100[H]  3.6   |  20[L]  |  0.57    Ca    8.4      29 Nov 2024 07:22  Phos  2.3     11-29  Mg     2.2     11-29    TPro  6.5  /  Alb  3.2[L]  /  TBili  0.4  /  DBili  <0.1  /  AST  26  /  ALT  57[H]  /  AlkPhos  102  11-29          Urinalysis Basic - ( 29 Nov 2024 07:22 )    Color: x / Appearance: x / SG: x / pH: x  Gluc: 100 mg/dL / Ketone: x  / Bili: x / Urobili: x   Blood: x / Protein: x / Nitrite: x   Leuk Esterase: x / RBC: x / WBC x   Sq Epi: x / Non Sq Epi: x / Bacteria: x          RADIOLOGY & ADDITIONAL TESTS:    Imaging Personally Reviewed:    Consultant(s) Notes Reviewed:      Care Discussed with Consultants/Other Providers:

## 2024-11-29 NOTE — PROGRESS NOTE ADULT - SUBJECTIVE AND OBJECTIVE BOX
CARDIOLOGY FOLLOW UP - Dr. Chanel  DATE OF SERVICE: 11/29/24    CC no CP or SOB      REVIEW OF SYSTEMS:  CONSTITUTIONAL: No fever, weight loss, or fatigue  RESPIRATORY: No cough, wheezing, chills or hemoptysis; No Shortness of Breath  CARDIOVASCULAR: No chest pain, palpitations, passing out, dizziness  GASTROINTESTINAL: No abdominal or epigastric pain. No nausea, vomiting, or hematemesis; No diarrhea or constipation. No melena or hematochezia.      PHYSICAL EXAM:  T(C): 37.1 (11-29-24 @ 05:41), Max: 37.2 (11-28-24 @ 17:06)  HR: 100 (11-29-24 @ 05:41) (95 - 109)  BP: 113/77 (11-29-24 @ 05:41) (113/77 - 145/91)  RR: 18 (11-29-24 @ 05:41) (18 - 18)  SpO2: 95% (11-29-24 @ 05:41) (93% - 95%)  Wt(kg): --  I&O's Summary    28 Nov 2024 07:01  -  29 Nov 2024 07:00  --------------------------------------------------------  IN: 1730 mL / OUT: 5 mL / NET: 1725 mL        Appearance: Normal	  Cardiovascular: Normal S1 S2,RRR, No JVD, No murmurs  Respiratory: Lungs clear to auscultation	  Gastrointestinal:  Soft, Non-tender, + BS	  Extremities: Normal range of motion, No clubbing, cyanosis or edema      Home Medications:      MEDICATIONS  (STANDING):  enoxaparin Injectable 40 milliGRAM(s) SubCutaneous every 24 hours  influenza  Vaccine (HIGH DOSE) 0.5 milliLiter(s) IntraMuscular once  lactated ringers. 1000 milliLiter(s) (75 mL/Hr) IV Continuous <Continuous>  piperacillin/tazobactam IVPB.. 3.375 Gram(s) IV Intermittent every 8 hours      TELEMETRY: 	    ECG:  	  RADIOLOGY:   DIAGNOSTIC TESTING:  [ ] Echocardiogram:  [ ]  Catheterization:  [ ] Stress Test:    OTHER: 	    LABS:	 	                            11.5   10.30 )-----------( 315      ( 29 Nov 2024 07:22 )             35.0     11-28    138  |  104  |  13  ----------------------------<  101[H]  4.0   |  18[L]  |  0.52    Ca    8.8      28 Nov 2024 06:32  Phos  3.7     11-28  Mg     2.2     11-28    TPro  6.9  /  Alb  3.5  /  TBili  0.4  /  DBili  x   /  AST  67[H]  /  ALT  82[H]  /  AlkPhos  118  11-28

## 2024-11-29 NOTE — DISCHARGE NOTE NURSING/CASE MANAGEMENT/SOCIAL WORK - NSDCFUADDAPPT_GEN_ALL_CORE_FT
APPTS ARE READY TO BE MADE: [X] YES    Best Family or Patient Contact (if needed):    Additional Information about above appointments (if needed):    1:   2:   3:     Other comments or requests:     Met with patient face to face and provided the patient with provider referral information, however patient prefers to schedule the appointments on their own.    Spoke with Patient and daughter. They will schedule based on daughters schedule. Gave IRP card if they need assistance.  Inpatient Referral Program [phone]

## 2024-12-04 LAB — SURGICAL PATHOLOGY STUDY: SIGNIFICANT CHANGE UP

## 2024-12-09 ENCOUNTER — APPOINTMENT (OUTPATIENT)
Dept: TRAUMA SURGERY | Facility: CLINIC | Age: 64
End: 2024-12-09

## 2025-08-06 ENCOUNTER — EMERGENCY (EMERGENCY)
Facility: HOSPITAL | Age: 65
LOS: 1 days | End: 2025-08-06
Attending: STUDENT IN AN ORGANIZED HEALTH CARE EDUCATION/TRAINING PROGRAM
Payer: COMMERCIAL

## 2025-08-06 VITALS
HEIGHT: 63 IN | HEART RATE: 122 BPM | WEIGHT: 143.08 LBS | TEMPERATURE: 99 F | SYSTOLIC BLOOD PRESSURE: 176 MMHG | DIASTOLIC BLOOD PRESSURE: 109 MMHG | OXYGEN SATURATION: 96 % | RESPIRATION RATE: 18 BRPM

## 2025-08-06 PROCEDURE — 99284 EMERGENCY DEPT VISIT MOD MDM: CPT

## 2025-08-06 PROCEDURE — 93010 ELECTROCARDIOGRAM REPORT: CPT

## 2025-08-07 VITALS
HEART RATE: 71 BPM | OXYGEN SATURATION: 98 % | DIASTOLIC BLOOD PRESSURE: 87 MMHG | TEMPERATURE: 98 F | RESPIRATION RATE: 20 BRPM | SYSTOLIC BLOOD PRESSURE: 142 MMHG

## 2025-08-07 LAB
ALBUMIN SERPL ELPH-MCNC: 4.6 G/DL — SIGNIFICANT CHANGE UP (ref 3.3–5)
ALP SERPL-CCNC: 109 U/L — SIGNIFICANT CHANGE UP (ref 40–120)
ALT FLD-CCNC: 31 U/L — SIGNIFICANT CHANGE UP (ref 10–45)
ANION GAP SERPL CALC-SCNC: 15 MMOL/L — SIGNIFICANT CHANGE UP (ref 5–17)
APPEARANCE UR: CLEAR — SIGNIFICANT CHANGE UP
AST SERPL-CCNC: 19 U/L — SIGNIFICANT CHANGE UP (ref 10–40)
BACTERIA # UR AUTO: NEGATIVE /HPF — SIGNIFICANT CHANGE UP
BASOPHILS # BLD AUTO: 0.06 K/UL — SIGNIFICANT CHANGE UP (ref 0–0.2)
BASOPHILS NFR BLD AUTO: 0.7 % — SIGNIFICANT CHANGE UP (ref 0–2)
BILIRUB SERPL-MCNC: 0.2 MG/DL — SIGNIFICANT CHANGE UP (ref 0.2–1.2)
BILIRUB UR-MCNC: NEGATIVE — SIGNIFICANT CHANGE UP
BUN SERPL-MCNC: 11 MG/DL — SIGNIFICANT CHANGE UP (ref 7–23)
CALCIUM SERPL-MCNC: 9.6 MG/DL — SIGNIFICANT CHANGE UP (ref 8.4–10.5)
CAST: 0 /LPF — SIGNIFICANT CHANGE UP (ref 0–4)
CHLORIDE SERPL-SCNC: 106 MMOL/L — SIGNIFICANT CHANGE UP (ref 96–108)
CO2 SERPL-SCNC: 23 MMOL/L — SIGNIFICANT CHANGE UP (ref 22–31)
COLOR SPEC: YELLOW — SIGNIFICANT CHANGE UP
CREAT SERPL-MCNC: 0.64 MG/DL — SIGNIFICANT CHANGE UP (ref 0.5–1.3)
CULTURE RESULTS: SIGNIFICANT CHANGE UP
D DIMER BLD IA.RAPID-MCNC: <150 NG/ML DDU — SIGNIFICANT CHANGE UP
DIFF PNL FLD: NEGATIVE — SIGNIFICANT CHANGE UP
EGFR: 97 ML/MIN/1.73M2 — SIGNIFICANT CHANGE UP
EGFR: 97 ML/MIN/1.73M2 — SIGNIFICANT CHANGE UP
EOSINOPHIL # BLD AUTO: 0.22 K/UL — SIGNIFICANT CHANGE UP (ref 0–0.5)
EOSINOPHIL NFR BLD AUTO: 2.7 % — SIGNIFICANT CHANGE UP (ref 0–6)
GLUCOSE SERPL-MCNC: 111 MG/DL — HIGH (ref 70–99)
GLUCOSE UR QL: NEGATIVE MG/DL — SIGNIFICANT CHANGE UP
HCG SERPL-ACNC: <2 MIU/ML — SIGNIFICANT CHANGE UP
HCT VFR BLD CALC: 42.1 % — SIGNIFICANT CHANGE UP (ref 34.5–45)
HGB BLD-MCNC: 14.2 G/DL — SIGNIFICANT CHANGE UP (ref 11.5–15.5)
IMM GRANULOCYTES # BLD AUTO: 0.02 K/UL — SIGNIFICANT CHANGE UP (ref 0–0.07)
IMM GRANULOCYTES NFR BLD AUTO: 0.2 % — SIGNIFICANT CHANGE UP (ref 0–0.9)
KETONES UR QL: NEGATIVE MG/DL — SIGNIFICANT CHANGE UP
LEUKOCYTE ESTERASE UR-ACNC: ABNORMAL
LIDOCAIN IGE QN: 29 U/L — SIGNIFICANT CHANGE UP (ref 7–60)
LYMPHOCYTES # BLD AUTO: 2.24 K/UL — SIGNIFICANT CHANGE UP (ref 1–3.3)
LYMPHOCYTES NFR BLD AUTO: 28 % — SIGNIFICANT CHANGE UP (ref 13–44)
MCHC RBC-ENTMCNC: 30.7 PG — SIGNIFICANT CHANGE UP (ref 27–34)
MCHC RBC-ENTMCNC: 33.7 G/DL — SIGNIFICANT CHANGE UP (ref 32–36)
MCV RBC AUTO: 90.9 FL — SIGNIFICANT CHANGE UP (ref 80–100)
MONOCYTES # BLD AUTO: 0.53 K/UL — SIGNIFICANT CHANGE UP (ref 0–0.9)
MONOCYTES NFR BLD AUTO: 6.6 % — SIGNIFICANT CHANGE UP (ref 2–14)
NEUTROPHILS # BLD AUTO: 4.94 K/UL — SIGNIFICANT CHANGE UP (ref 1.8–7.4)
NEUTROPHILS NFR BLD AUTO: 61.8 % — SIGNIFICANT CHANGE UP (ref 43–77)
NITRITE UR-MCNC: NEGATIVE — SIGNIFICANT CHANGE UP
NRBC # BLD AUTO: 0 K/UL — SIGNIFICANT CHANGE UP (ref 0–0)
NRBC # FLD: 0 K/UL — SIGNIFICANT CHANGE UP (ref 0–0)
NRBC BLD AUTO-RTO: 0 /100 WBCS — SIGNIFICANT CHANGE UP (ref 0–0)
PH UR: 7 — SIGNIFICANT CHANGE UP (ref 5–8)
PLATELET # BLD AUTO: 278 K/UL — SIGNIFICANT CHANGE UP (ref 150–400)
PMV BLD: 9.5 FL — SIGNIFICANT CHANGE UP (ref 7–13)
POTASSIUM SERPL-MCNC: 3.7 MMOL/L — SIGNIFICANT CHANGE UP (ref 3.5–5.3)
POTASSIUM SERPL-SCNC: 3.7 MMOL/L — SIGNIFICANT CHANGE UP (ref 3.5–5.3)
PROT SERPL-MCNC: 8 G/DL — SIGNIFICANT CHANGE UP (ref 6–8.3)
PROT UR-MCNC: NEGATIVE MG/DL — SIGNIFICANT CHANGE UP
RBC # BLD: 4.63 M/UL — SIGNIFICANT CHANGE UP (ref 3.8–5.2)
RBC # FLD: 12.3 % — SIGNIFICANT CHANGE UP (ref 10.3–14.5)
RBC CASTS # UR COMP ASSIST: 0 /HPF — SIGNIFICANT CHANGE UP (ref 0–4)
REVIEW: SIGNIFICANT CHANGE UP
SODIUM SERPL-SCNC: 144 MMOL/L — SIGNIFICANT CHANGE UP (ref 135–145)
SP GR SPEC: <1.005 — LOW (ref 1–1.03)
SPECIMEN SOURCE: SIGNIFICANT CHANGE UP
SQUAMOUS # UR AUTO: 0 /HPF — SIGNIFICANT CHANGE UP (ref 0–5)
TROPONIN T, HIGH SENSITIVITY RESULT: <6 NG/L — SIGNIFICANT CHANGE UP (ref 0–51)
UROBILINOGEN FLD QL: 0.2 MG/DL — SIGNIFICANT CHANGE UP (ref 0.2–1)
WBC # BLD: 8.01 K/UL — SIGNIFICANT CHANGE UP (ref 3.8–10.5)
WBC # FLD AUTO: 8.01 K/UL — SIGNIFICANT CHANGE UP (ref 3.8–10.5)
WBC UR QL: 1 /HPF — SIGNIFICANT CHANGE UP (ref 0–5)

## 2025-08-07 PROCEDURE — 71045 X-RAY EXAM CHEST 1 VIEW: CPT | Mod: 26

## 2025-08-07 PROCEDURE — 80053 COMPREHEN METABOLIC PANEL: CPT

## 2025-08-07 PROCEDURE — 84484 ASSAY OF TROPONIN QUANT: CPT

## 2025-08-07 PROCEDURE — 83690 ASSAY OF LIPASE: CPT

## 2025-08-07 PROCEDURE — 93005 ELECTROCARDIOGRAM TRACING: CPT

## 2025-08-07 PROCEDURE — 36000 PLACE NEEDLE IN VEIN: CPT

## 2025-08-07 PROCEDURE — 84702 CHORIONIC GONADOTROPIN TEST: CPT

## 2025-08-07 PROCEDURE — 71045 X-RAY EXAM CHEST 1 VIEW: CPT

## 2025-08-07 PROCEDURE — 81001 URINALYSIS AUTO W/SCOPE: CPT

## 2025-08-07 PROCEDURE — 85025 COMPLETE CBC W/AUTO DIFF WBC: CPT

## 2025-08-07 PROCEDURE — 85379 FIBRIN DEGRADATION QUANT: CPT

## 2025-08-07 PROCEDURE — 99285 EMERGENCY DEPT VISIT HI MDM: CPT | Mod: 25

## 2025-08-07 PROCEDURE — 87086 URINE CULTURE/COLONY COUNT: CPT

## (undated) DEVICE — TROCAR APPLIED MEDICAL KII BALLOON BLUNT TIP 12MM X 100MM

## (undated) DEVICE — DRSG STERISTRIPS 0.5 X 4"

## (undated) DEVICE — SOL IRR POUR H2O 250ML

## (undated) DEVICE — GLV 8.5 PROTEXIS (WHITE)

## (undated) DEVICE — BLADE SCALPEL SAFETYLOCK #11

## (undated) DEVICE — ELCTR BOVIE PENCIL SMOKE EVACUATION

## (undated) DEVICE — TUBING PLUME AWAY 4.0

## (undated) DEVICE — MARKING PEN W RULER

## (undated) DEVICE — DRSG OPSITE 2.5 X 2"

## (undated) DEVICE — TROCAR COVIDIEN VERSAPORT BLADELESS OPTICAL 5MM STANDARD

## (undated) DEVICE — STOPCOCK 4-WAY W SWIVEL MALE LUER LOCK NON VENTED RED CAP

## (undated) DEVICE — SOL IRR POUR NS 0.9% 500ML

## (undated) DEVICE — SHEARS COVIDIEN ENDO SHEAR 5MM X 31CM W UNIPOLAR CAUTERY

## (undated) DEVICE — GLV 8 PROTEXIS (WHITE)

## (undated) DEVICE — VENODYNE/SCD SLEEVE CALF MEDIUM

## (undated) DEVICE — GLV 7 PROTEXIS (WHITE)

## (undated) DEVICE — DRSG TELFA 3 X 8

## (undated) DEVICE — GLV 6.5 PROTEXIS (WHITE)

## (undated) DEVICE — DRAPE GENERAL ENDOSCOPY

## (undated) DEVICE — TUBING STRYKEFLOW II SUCTION / IRRIGATOR

## (undated) DEVICE — DISSECTOR ENDO PEANUT 5MM

## (undated) DEVICE — GLV 7.5 PROTEXIS (WHITE)

## (undated) DEVICE — PACK LAP CHOLE LAP APPENDECTOMY

## (undated) DEVICE — MEDICATION LABELS W MARKER

## (undated) DEVICE — POSITIONER FOAM EGG CRATE ULNAR 2PCS (PINK)

## (undated) DEVICE — SUT POLYSORB 0 36" GU-46

## (undated) DEVICE — SUT MONOCRYL 4-0 18" PS-2

## (undated) DEVICE — ELCTR CORD FOOTSWITCH 1PLR LAPSCP 10FT

## (undated) DEVICE — ENDOCATCH 10MM SPECIMEN POUCH

## (undated) DEVICE — SPECIMEN CONTAINER 100ML

## (undated) DEVICE — DRAPE INSTRUMENT POUCH 6.75" X 11"

## (undated) DEVICE — APPLICATOR SURGICEL LAP TROCAR POINT 2.5MM X 150MM

## (undated) DEVICE — WARMING BLANKET UPPER ADULT

## (undated) DEVICE — DRAPE TOWEL BLUE 17" X 24"

## (undated) DEVICE — LIGASURE MARYLAND 5MM X 37CM

## (undated) DEVICE — GOWN TRIMAX LG

## (undated) DEVICE — TUBING TRUWAVE PRESSURE MALE/FEMALE 72"

## (undated) DEVICE — PREP CHLORAPREP HI-LITE ORANGE 26ML